# Patient Record
Sex: MALE | Race: WHITE | Employment: OTHER | URBAN - METROPOLITAN AREA
[De-identification: names, ages, dates, MRNs, and addresses within clinical notes are randomized per-mention and may not be internally consistent; named-entity substitution may affect disease eponyms.]

---

## 2017-01-26 ENCOUNTER — OFFICE VISIT (OUTPATIENT)
Dept: FAMILY MEDICINE | Facility: OTHER | Age: 62
End: 2017-01-26
Payer: COMMERCIAL

## 2017-01-26 ENCOUNTER — TELEPHONE (OUTPATIENT)
Dept: FAMILY MEDICINE | Facility: OTHER | Age: 62
End: 2017-01-26

## 2017-01-26 VITALS
DIASTOLIC BLOOD PRESSURE: 102 MMHG | HEIGHT: 66 IN | BODY MASS INDEX: 30.7 KG/M2 | RESPIRATION RATE: 14 BRPM | SYSTOLIC BLOOD PRESSURE: 180 MMHG | TEMPERATURE: 97.2 F | HEART RATE: 70 BPM | WEIGHT: 191 LBS

## 2017-01-26 DIAGNOSIS — I10 ESSENTIAL HYPERTENSION, BENIGN: Primary | ICD-10-CM

## 2017-01-26 PROCEDURE — 99213 OFFICE O/P EST LOW 20 MIN: CPT | Performed by: FAMILY MEDICINE

## 2017-01-26 RX ORDER — IRBESARTAN 300 MG/1
300 TABLET ORAL DAILY
Qty: 30 TABLET | Refills: 0 | Status: SHIPPED | OUTPATIENT
Start: 2017-01-26 | End: 2017-02-23

## 2017-01-26 RX ORDER — CHLORTHALIDONE 25 MG/1
12.5 TABLET ORAL DAILY
Qty: 15 TABLET | Refills: 0 | Status: SHIPPED | OUTPATIENT
Start: 2017-01-26 | End: 2017-03-03

## 2017-01-26 ASSESSMENT — PAIN SCALES - GENERAL: PAINLEVEL: MODERATE PAIN (5)

## 2017-01-26 NOTE — MR AVS SNAPSHOT
After Visit Summary   1/26/2017    Tomy Maldonado    MRN: [id number]           Patient Information     Date Of Birth          1955        Visit Information        Provider Department      1/26/2017 11:40 AM Magdalena Rojo MD [facility]        Today's Diagnoses     Essential hypertension, benign    -  1       Care Instructions      Low-Salt Diet (2 Grams/Day)  This diet eliminates foods that are high in salt and restricts the amount of salt that you cook with. It is most often used for patients with high blood pressure, edema (fluid retention), kidney, liver, and heart disease.  Table salt contains the mineral sodium. The body needs sodium to work normally. But too much sodium can make your health problems worse. Your health care provider is recommending a low-salt (also called low-sodium) diet for you. Your total daily allowance of salt (sodium) is 2 grams. This equals 2,000 milligrams (mg). It is less than 1 teaspoon of table salt. This means you can have only about 700 mg of sodium at each meal.  When you cook, limit the salt you use. And if you can avoid using salt, even better. Do not add salt at the table. So, throw away the saltshaker!  When shopping, read the package labels. Salt is often called  sodium  on the label. Choose foods that are salt-free, low salt, or very low salt. Note that foods with reduced salt or reduced sodium may not lower your salt intake enough.    Beverages  Ok: Tea, coffee, carbonated beverages, juices  Avoid: Flavored international coffees, electrolyte replacement drinks, sports beverages  Bread and cereals  Ok: Low sodium bread and rolls, cereals, cakes; low-salt crackers, matzo crackers  Avoid: Salted crackers, pretzels, popcorn; English toast, pancakes, muffins  Desserts  Ok: Ice cream, frozen yogurt, juice bars, gelatin, cookies and pies, sugar, honey, jelly, hard candy  Avoid: Most pies, cakes and cookies prepared or processed with salt,  instant pudding  Meats  Ok: All fresh meat, fish, poultry, low-salt tuna, eggs, egg substitute  Avoid: Smoked, pickled, brine-cured, or salted meats and fish. This includes casiano, chipped beef, corned beef, hot dogs, luncheon meats, ham, kosher meats, salt pork, sausage, canned tuna, salted codfish, smoked salmon, herring, sardines, or anchovies.  Dairy  Ok: Milk, chocolate milk, hot chocolate mix,  low-salt  cheeses, and yogurt  Avoid: Processed cheese, cheese spreads, Roquefort, Camembert, and cottage cheese, buttermilk, instant breakfast drink  Beans, potatoes, and pasta  Ok: Dry beans, split peas, lentils, potatoes, rice, macaroni, pasta, spaghetti without added salt  Avoid: Potato chips, tortilla chips, and similar products  Soups  Ok: Low-salt soups and broths made with allowed foods  Avoid: Bouillon cubes, soups with smoked or salted meats, regular soup and broth  Vegetables  Ok: Most are okay; low-salt tomato and vegetable juices  Avoid: Sauerkraut and other brine-soaked vegetables, pickles and other pickled vegetables, tomato juice, olives  Seasoning and spices  Ok: Most seasonings are okay. Good substitutes for salt include: fresh herb blends, hot sauce, lemon, garlic, rashid, vinegar, dry mustard, parsley, cilantro, horseradish, tomato paste, regular margarine, mayonnaise, butter, cream cheese, vegetable oil, cream, low-salt salad dressing and gravy  Avoid: Regular ketchup, relishes, pickles, soy sauce, teriyaki sauce, Worcestershire sauce, BBQ sauce, tartar sauce, meat tenderizer, chili sauce, regular gravy, regular salad dressing    7457-1869 [employer]. [address] [place] [address]. All rights reserved. This information is not intended as a substitute for professional medical care. Always follow your healthcare professional's instructions.              Follow-ups after your visit        Follow-up notes from your care team     Return in about 1 day (around 1/27/2017).      Who  "to contact     If you have questions or need follow up information about today's clinic visit or your schedule please contact [facility] directly at [phone].  Normal or non-critical lab and imaging results will be communicated to you by MyChart, letter or phone within 4 business days after the clinic has received the results. If you do not hear from us within 7 days, please contact the clinic through MyChart or phone. If you have a critical or abnormal lab result, we will notify you by phone as soon as possible.  Submit refill requests through [employer] or call your pharmacy and they will forward the refill request to us. Please allow 3 business days for your refill to be completed.          Additional Information About Your Visit        [employer] Information     [employer] lets you send messages to your doctor, view your test results, renew your prescriptions, schedule appointments and more. To sign up, go to [email or web address] . Click on \"Log in\" on the left side of the screen, which will take you to the Welcome page. Then click on \"Sign up Now\" on the right side of the page.     You will be asked to enter the access code listed below, as well as some personal information. Please follow the directions to create your username and password.     Your access code is: [email or web address]  Expires: 3/22/2017  1:18 PM     Your access code will  in 90 days. If you need help or a new code, please call your [place] clinic or [phone].        Care EveryWhere ID     This is your Care EveryWhere ID. This could be used by other organizations to access your [facility] medical records  [id number]        Your Vitals Were     Pulse Temperature Respirations Height BMI (Body Mass Index)       70 97.2  F (36.2  C) (Temporal) 14 5' 6.26\" (1.683 m) 30.59 kg/m2        Blood Pressure from Last 3 Encounters:   17 180/102   16 198/110   12/31/10 138/91    Weight from Last 3 Encounters:   17 191 lb " (86.637 kg)   12/22/16 190 lb (86.183 kg)   05/27/10 177 lb 6.4 oz (80.468 kg)              Today, you had the following     No orders found for display         Today's Medication Changes          These changes are accurate as of: 1/26/17 12:24 PM.  If you have any questions, ask your nurse or doctor.               Start taking these medicines.        Dose/Directions    chlorthalidone 25 MG tablet   Commonly known as:  HYGROTON   Used for:  Essential hypertension, benign   Started by:  Magdalena Rojo MD        Dose:  12.5 mg   Take 0.5 tablets (12.5 mg) by mouth daily   Quantity:  15 tablet   Refills:  0         These medicines have changed or have updated prescriptions.        Dose/Directions    irbesartan 300 MG tablet   Commonly known as:  AVAPRO   This may have changed:    - medication strength  - how much to take   Used for:  Essential hypertension, benign   Changed by:  Magdalena Rojo MD        Dose:  300 mg   Take 1 tablet (300 mg) by mouth daily   Quantity:  30 tablet   Refills:  0            Where to get your medicines      These medications were sent to [facility] [address] [place] [address] - [address]  [address] [facility] [address]     Phone:  [phone]    - chlorthalidone 25 MG tablet  - irbesartan 300 MG tablet             Primary Care Provider Office Phone # Fax #    Magdalena Rojo [phone]       [facility] [address] [facility] [address]    [facility] [address]        Thank you!     Thank you for choosing [facility]  for your care. Our goal is always to provide you with excellent care. Hearing back from our patients is one way we can continue to improve our services. Please take a few minutes to complete the written survey that you may receive in the mail after your visit with us. Thank you!             Your Updated Medication List - Protect others around you: Learn how to safely use, store and throw away your medicines at  www.disposemymeds.org.          This list is accurate as of: 1/26/17 12:24 PM.  Always use your most recent med list.                   Brand Name Dispense Instructions for use    albuterol 108 (90 BASE) MCG/ACT Inhaler   Generic drug:  albuterol     1 Inhaler    2 puffs PO q4-6 hours prn cough/wheeze/shortness of breath ( please give a spacer )       aspirin 81 MG tablet     0    ONE DAILY       chlorthalidone 25 MG tablet    HYGROTON    15 tablet    Take 0.5 tablets (12.5 mg) by mouth daily       irbesartan 300 MG tablet    AVAPRO    30 tablet    Take 1 tablet (300 mg) by mouth daily       TYLENOL PM EXTRA STRENGTH  MG tablet   Generic drug:  diphenhydrAMINE-acetaminophen     0    1 TABLET AT BEDTIME AS NEEDED

## 2017-01-26 NOTE — NURSING NOTE
"Chief Complaint   Patient presents with     Hypertension     Panel Management     flu, honoring choices, care everywhere        Initial /102 mmHg  Pulse 70  Temp(Src) 97.2  F (36.2  C) (Temporal)  Resp 14  Ht 5' 6.26\" (1.683 m)  Wt 191 lb (86.637 kg)  BMI 30.59 kg/m2 Estimated body mass index is 30.59 kg/(m^2) as calculated from the following:    Height as of this encounter: 5' 6.26\" (1.683 m).    Weight as of this encounter: 191 lb (86.637 kg).  BP completed using cuff size: regular/long  Qian Denny CMA    "

## 2017-01-26 NOTE — ASSESSMENT & PLAN NOTE
Blood pressures are still persistently elevated.  Increase dose of Avapro 300 mg daily  Add chlorthalidone  12.5 mg daily  Discussed DASH diet and regular exercise.  Recheck in one month for close follow-up. Reportable signs and symptoms discussed

## 2017-01-26 NOTE — PATIENT INSTRUCTIONS
Low-Salt Diet (2 Grams/Day)  This diet eliminates foods that are high in salt and restricts the amount of salt that you cook with. It is most often used for patients with high blood pressure, edema (fluid retention), kidney, liver, and heart disease.  Table salt contains the mineral sodium. The body needs sodium to work normally. But too much sodium can make your health problems worse. Your health care provider is recommending a low-salt (also called low-sodium) diet for you. Your total daily allowance of salt (sodium) is 2 grams. This equals 2,000 milligrams (mg). It is less than 1 teaspoon of table salt. This means you can have only about 700 mg of sodium at each meal.  When you cook, limit the salt you use. And if you can avoid using salt, even better. Do not add salt at the table. So, throw away the saltshaker!  When shopping, read the package labels. Salt is often called  sodium  on the label. Choose foods that are salt-free, low salt, or very low salt. Note that foods with reduced salt or reduced sodium may not lower your salt intake enough.    Beverages  Ok: Tea, coffee, carbonated beverages, juices  Avoid: Flavored international coffees, electrolyte replacement drinks, sports beverages  Bread and cereals  Ok: Low sodium bread and rolls, cereals, cakes; low-salt crackers, matzo crackers  Avoid: Salted crackers, pretzels, popcorn; Slovenian toast, pancakes, muffins  Desserts  Ok: Ice cream, frozen yogurt, juice bars, gelatin, cookies and pies, sugar, honey, jelly, hard candy  Avoid: Most pies, cakes and cookies prepared or processed with salt, instant pudding  Meats  Ok: All fresh meat, fish, poultry, low-salt tuna, eggs, egg substitute  Avoid: Smoked, pickled, brine-cured, or salted meats and fish. This includes casiano, chipped beef, corned beef, hot dogs, luncheon meats, ham, kosher meats, salt pork, sausage, canned tuna, salted codfish, smoked salmon, herring, sardines, or anchovies.  Dairy  Ok: Milk,  chocolate milk, hot chocolate mix,  low-salt  cheeses, and yogurt  Avoid: Processed cheese, cheese spreads, Roquefort, Camembert, and cottage cheese, buttermilk, instant breakfast drink  Beans, potatoes, and pasta  Ok: Dry beans, split peas, lentils, potatoes, rice, macaroni, pasta, spaghetti without added salt  Avoid: Potato chips, tortilla chips, and similar products  Soups  Ok: Low-salt soups and broths made with allowed foods  Avoid: Bouillon cubes, soups with smoked or salted meats, regular soup and broth  Vegetables  Ok: Most are okay; low-salt tomato and vegetable juices  Avoid: Sauerkraut and other brine-soaked vegetables, pickles and other pickled vegetables, tomato juice, olives  Seasoning and spices  Ok: Most seasonings are okay. Good substitutes for salt include: fresh herb blends, hot sauce, lemon, garlic, rashid, vinegar, dry mustard, parsley, cilantro, horseradish, tomato paste, regular margarine, mayonnaise, butter, cream cheese, vegetable oil, cream, low-salt salad dressing and gravy  Avoid: Regular ketchup, relishes, pickles, soy sauce, teriyaki sauce, Worcestershire sauce, BBQ sauce, tartar sauce, meat tenderizer, chili sauce, regular gravy, regular salad dressing    1584-0816 [employer]. [address] [place] [address], [place] [address]. All rights reserved. This information is not intended as a substitute for professional medical care. Always follow your healthcare professional's instructions.

## 2017-01-26 NOTE — TELEPHONE ENCOUNTER
Left message for patient to call back. Please see message below and help get scheduled for labs.  Parisa Lambert, [place]

## 2017-01-26 NOTE — TELEPHONE ENCOUNTER
Please call patient and informed him to have a blood draw to recheck on his sodium levels that were elevated on his last test. Future orders in place. I can see him backIn the clinic on 24 February as scheduled

## 2017-01-26 NOTE — Clinical Note
[address]  [facility] [address]  [phone]      February 1, 2017      Tomy Maldonado  [address]  [facility] [address]        Dear Tomy,    We have tried to reach you by phone but have been unsuccessful. Dr. Rojo would like your sodium levels to be rechecked because your last level was elevated. Please call the clinic at [phone] to schedule a lab only appointment to have this done. You can still see Dr. Rojo on 02/24 as scheduled.    Thank you,  [facility]

## 2017-01-26 NOTE — PROGRESS NOTES
SUBJECTIVE:                                                    Tomy Maldonado is a 61 year old male who presents to clinic today for the following health issues:      HPI    Hypertension Follow-up      Outpatient blood pressures are not being checked.    Low Salt Diet: I do not add any thing.      Problem list and histories reviewed & adjusted, as indicated.  Additional history: as documented             Patient Active Problem List   Diagnosis     Cervicalgia     Essential hypertension, benign     Allergic rhinitis     Hyperlipidemia LDL goal <130     Tendinopathy of left rotator cuff     Past Surgical History   Procedure Laterality Date     Hc knee scope,med/lat menisectomy       Left knee medial meniscectomy     C nonspecific procedure       left ankle surgery     C repair detach retina,scleral buckle  1973     Hc removal testis,simple  10/01/2007     Right inguinal orchiectomy.     Colonoscopy  12/18/2009     normal       Social History   Substance Use Topics     Smoking status: Former Smoker -- 0.50 packs/day     Start date: 12/22/1964     Quit date: 09/22/2016     Smokeless tobacco: Not on file      Comment: 4 cigs per day     Alcohol Use: Yes      Comment: some     Family History   Problem Relation Age of Onset     Hypertension Mother      Hypertension Father      DIABETES Father      adult, late onset     Lipids Father      ?     Allergies Father      Allergies Paternal Grandfather      Allergies Sister      Allergies Sister          Current Outpatient Prescriptions   Medication Sig Dispense Refill     irbesartan (AVAPRO) 300 MG tablet Take 1 tablet (300 mg) by mouth daily 30 tablet 0     chlorthalidone (HYGROTON) 25 MG tablet Take 0.5 tablets (12.5 mg) by mouth daily 15 tablet 0     TYLENOL PM EXTRA STRENGTH 500-25 MG OR TABS 1 TABLET AT BEDTIME AS NEEDED 0 0     [DISCONTINUED] irbesartan (AVAPRO) 150 MG tablet Take 1 tablet (150 mg) by mouth daily 30 tablet 1     PROAIR  (90 BASE) MCG/ACT IN AERS  "2 puffs PO q4-6 hours prn cough/wheeze/shortness of breath ( please give a spacer ) 1 Inhaler 6     ASPIRIN 81 MG OR TABS ONE DAILY 0 0     Allergies   Allergen Reactions     No Known Drug Allergies      BP Readings from Last 3 Encounters:   01/26/17 180/102   12/22/16 198/110   12/31/10 138/91    Wt Readings from Last 3 Encounters:   01/26/17 191 lb (86.637 kg)   12/22/16 190 lb (86.183 kg)   05/27/10 177 lb 6.4 oz (80.468 kg)                  Labs reviewed in EPIC  Problem list, Medication list, Allergies, and Medical/Social/Surgical histories reviewed in [facility] and updated as appropriate.    ROS:  Constitutional, HEENT, cardiovascular, pulmonary, gi and gu systems are negative, except as otherwise noted.    OBJECTIVE:                                                    /102 mmHg  Pulse 70  Temp(Src) 97.2  F (36.2  C) (Temporal)  Resp 14  Ht 5' 6.26\" (1.683 m)  Wt 191 lb (86.637 kg)  BMI 30.59 kg/m2  Body mass index is 30.59 kg/(m^2).  Physical Exam   Constitutional: He appears well-developed and well-nourished.   HENT:   Head: Normocephalic and atraumatic.   Cardiovascular: Normal rate and regular rhythm.    Pulmonary/Chest: Effort normal and breath sounds normal.   Psychiatric: He has a normal mood and affect.         Diagnostic Test Results:  none      ASSESSMENT/PLAN:                                                      Problem List Items Addressed This Visit     Essential hypertension, benign - Primary     Blood pressures are still persistently elevated.  Increase dose of Avapro 300 mg daily  Add chlorthalidone  12.5 mg daily  Discussed DASH diet and regular exercise.  Recheck in one month for close follow-up. Reportable signs and symptoms discussed         Relevant Medications    irbesartan (AVAPRO) 300 MG tablet    chlorthalidone (HYGROTON) 25 MG tablet             Magdalena Rojo MD  [facility]"

## 2017-02-21 NOTE — PROGRESS NOTES
SUBJECTIVE:                                                    Tomy Maldonado is a 61 year old male who presents to clinic today for the following health issues:      HPI    Hypertension Follow-up      Outpatient blood pressures are being checked at home.  Results are 140/90's.    Low Salt Diet: no added salt       Problem list and histories reviewed & adjusted, as indicated.  Additional history: as documented        Patient Active Problem List   Diagnosis     Cervicalgia     Essential hypertension, benign     Allergic rhinitis     Hyperlipidemia LDL goal <130     Tendinopathy of left rotator cuff     Past Surgical History   Procedure Laterality Date     Hc knee scope,med/lat menisectomy       Left knee medial meniscectomy     C nonspecific procedure       left ankle surgery     C repair detach retina,scleral buckle  1973     Hc removal testis,simple  10/01/2007     Right inguinal orchiectomy.     Colonoscopy  12/18/2009     normal       Social History   Substance Use Topics     Smoking status: Former Smoker     Packs/day: 0.50     Start date: 12/22/1964     Quit date: 9/22/2016     Smokeless tobacco: Not on file      Comment: 4 cigs per day     Alcohol use Yes      Comment: some     Family History   Problem Relation Age of Onset     Hypertension Mother      Hypertension Father      DIABETES Father      adult, late onset     Lipids Father      ?     Allergies Father      Allergies Paternal Grandfather      Allergies Sister      Allergies Sister          Current Outpatient Prescriptions   Medication Sig Dispense Refill     chlorthalidone (HYGROTON) 25 MG tablet Take 1 tablet (25 mg) by mouth daily 90 tablet 0     irbesartan (AVAPRO) 300 MG tablet Take 1 tablet (300 mg) by mouth daily 90 tablet 0     atorvastatin (LIPITOR) 40 MG tablet Take 1 tablet (40 mg) by mouth daily 90 tablet 3     PROAIR  (90 BASE) MCG/ACT IN AERS 2 puffs PO q4-6 hours prn cough/wheeze/shortness of breath ( please give a spacer ) 1  Inhaler 6     TYLENOL PM EXTRA STRENGTH 500-25 MG OR TABS 1 TABLET AT BEDTIME AS NEEDED 0 0     ASPIRIN 81 MG OR TABS ONE DAILY 0 0     [DISCONTINUED] irbesartan (AVAPRO) 300 MG tablet Take 1 tablet (300 mg) by mouth daily 30 tablet 0     [DISCONTINUED] chlorthalidone (HYGROTON) 25 MG tablet Take 0.5 tablets (12.5 mg) by mouth daily 15 tablet 0     Allergies   Allergen Reactions     No Known Drug Allergies      BP Readings from Last 3 Encounters:   03/03/17 (!) 146/100   01/26/17 (!) 180/102   12/22/16 (!) 198/110    Wt Readings from Last 3 Encounters:   03/03/17 194 lb (88 kg)   01/26/17 191 lb (86.6 kg)   12/22/16 190 lb (86.2 kg)                  Labs reviewed in EPIC    ROS:  Constitutional, HEENT, cardiovascular, pulmonary, gi and gu systems are negative, except as otherwise noted.    OBJECTIVE:                                                    BP (!) 146/100 (BP Location: Right arm, Patient Position: Chair, Cuff Size: Adult Regular)  Pulse 80  Temp 97.7  F (36.5  C) (Temporal)  Resp 14  Wt 194 lb (88 kg)  BMI 31.07 kg/m2  Body mass index is 31.07 kg/(m^2).  Physical Exam   Constitutional: He appears well-developed and well-nourished.   HENT:   Head: Normocephalic and atraumatic.   Cardiovascular: Normal rate and regular rhythm.    Pulmonary/Chest: Effort normal and breath sounds normal.   Musculoskeletal: He exhibits no edema.   Psychiatric: He has a normal mood and affect.         Diagnostic Test Results:  none      ASSESSMENT/PLAN:                                                      Problem List Items Addressed This Visit     Essential hypertension, benign     Improved but not at goal  Increase Chlorthalidone to 25mg daily . Continue irbesartan at 300mg  Had hypernatremia with last chemistries. Recheck today  Recheck in 1 month          Relevant Medications    chlorthalidone (HYGROTON) 25 MG tablet    irbesartan (AVAPRO) 300 MG tablet    Hyperlipidemia LDL goal <130     The 10-year ASCVD risk score (Shell  DONNA Earl, et al., 2013) is: 17.3%    Values used to calculate the score:      Age: 61 years      Sex: Male      Is Non- : No      Diabetic: No      Tobacco smoker: No      Systolic Blood Pressure: 146 mmHg      Is Prescribed Antihypertensives: Yes      HDL Cholesterol: 59 mg/dL      Total Cholesterol: 305 mg/dL   Start mod intensity statin            Relevant Medications    atorvastatin (LIPITOR) 40 MG tablet      Other Visit Diagnoses     Hypernatremia    -  Primary    Relevant Orders    Comprehensive metabolic panel (BMP + Alb, Alk Phos, ALT, AST, Total. Bili, TP)    Hyperlipidemia, unspecified hyperlipidemia type        Relevant Medications    atorvastatin (LIPITOR) 40 MG tablet           Magdalena Rojo MD  [facility]

## 2017-02-23 ENCOUNTER — TELEPHONE (OUTPATIENT)
Dept: FAMILY MEDICINE | Facility: OTHER | Age: 62
End: 2017-02-23

## 2017-02-23 DIAGNOSIS — I10 ESSENTIAL HYPERTENSION, BENIGN: ICD-10-CM

## 2017-02-23 RX ORDER — IRBESARTAN 300 MG/1
300 TABLET ORAL DAILY
Qty: 30 TABLET | Refills: 0 | Status: SHIPPED | OUTPATIENT
Start: 2017-02-23 | End: 2017-03-03

## 2017-02-23 NOTE — TELEPHONE ENCOUNTER
Reason for call:  Medication  Reason for Call:  Medication or medication refill:    Do you use a [facility]?  Name of the pharmacy and phone number for the current request:  [facility] - [phone]    Name of the medication requested: irbesartan (AVAPRO) 300 MG tablet    Other request: Had an appt on 2/24/17 but had to cancel because of flu but efrain appt for 3/3/17  Can we leave a detailed message on this number? YES    Phone number patient can be reached at: Home number on file [phone] (home) or Cell number on file:    No relevant phone numbers on file.       Best Time: anytime    Call taken on 2/23/2017 at 3:15 PM by Noemi Melo

## 2017-02-23 NOTE — TELEPHONE ENCOUNTER
Avapro     Last Written Prescription Date: 01/26/2017  Last Fill Quantity: 30, # refills: 0  Last Office Visit with [facility], [facility] or [facility] prescribing provider: 01/26/2017  Next 5 appointments (look out 90 days)     Mar 03, 2017 12:40 PM CST   [facility]dal[facility]    [address] [place] [id number] [phone]                   Potassium   Date Value Ref Range Status   12/22/2016 4.7 3.4 - 5.3 mmol/L Final     Creatinine   Date Value Ref Range Status   12/22/2016 1.13 0.66 - 1.25 mg/dL Final     BP Readings from Last 3 Encounters:   01/26/17 (!) 180/102   12/22/16 (!) 198/110   12/31/10 138/91

## 2017-02-23 NOTE — TELEPHONE ENCOUNTER
Medication is being filled for 1 time refill only due to:  Patient needs to be seen because due for HTN OV and labs.     Macrina Boo, RN, BSN

## 2017-03-03 ENCOUNTER — OFFICE VISIT (OUTPATIENT)
Dept: FAMILY MEDICINE | Facility: OTHER | Age: 62
End: 2017-03-03
Payer: COMMERCIAL

## 2017-03-03 VITALS
RESPIRATION RATE: 14 BRPM | HEART RATE: 80 BPM | TEMPERATURE: 97.7 F | BODY MASS INDEX: 31.07 KG/M2 | DIASTOLIC BLOOD PRESSURE: 100 MMHG | SYSTOLIC BLOOD PRESSURE: 146 MMHG | WEIGHT: 194 LBS

## 2017-03-03 DIAGNOSIS — E78.5 HYPERLIPIDEMIA LDL GOAL <130: ICD-10-CM

## 2017-03-03 DIAGNOSIS — I10 ESSENTIAL HYPERTENSION, BENIGN: ICD-10-CM

## 2017-03-03 DIAGNOSIS — E87.0 HYPERNATREMIA: Primary | ICD-10-CM

## 2017-03-03 DIAGNOSIS — E78.5 HYPERLIPIDEMIA, UNSPECIFIED HYPERLIPIDEMIA TYPE: ICD-10-CM

## 2017-03-03 LAB
ALBUMIN SERPL-MCNC: 4.1 G/DL (ref 3.4–5)
ALP SERPL-CCNC: 80 U/L (ref 40–150)
ALT SERPL W P-5'-P-CCNC: 41 U/L (ref 0–70)
ANION GAP SERPL CALCULATED.3IONS-SCNC: 10 MMOL/L (ref 3–14)
AST SERPL W P-5'-P-CCNC: 16 U/L (ref 0–45)
BILIRUB SERPL-MCNC: 0.5 MG/DL (ref 0.2–1.3)
BUN SERPL-MCNC: 19 MG/DL (ref 7–30)
CALCIUM SERPL-MCNC: 9.4 MG/DL (ref 8.5–10.1)
CHLORIDE SERPL-SCNC: 104 MMOL/L (ref 94–109)
CO2 SERPL-SCNC: 26 MMOL/L (ref 20–32)
CREAT SERPL-MCNC: 1.02 MG/DL (ref 0.66–1.25)
GFR SERPL CREATININE-BSD FRML MDRD: 74 ML/MIN/1.7M2
GLUCOSE SERPL-MCNC: 98 MG/DL (ref 70–99)
POTASSIUM SERPL-SCNC: 4.9 MMOL/L (ref 3.4–5.3)
PROT SERPL-MCNC: 7.7 G/DL (ref 6.8–8.8)
SODIUM SERPL-SCNC: 140 MMOL/L (ref 133–144)

## 2017-03-03 PROCEDURE — 80053 COMPREHEN METABOLIC PANEL: CPT | Performed by: FAMILY MEDICINE

## 2017-03-03 PROCEDURE — 36415 COLL VENOUS BLD VENIPUNCTURE: CPT | Performed by: FAMILY MEDICINE

## 2017-03-03 PROCEDURE — 99214 OFFICE O/P EST MOD 30 MIN: CPT | Performed by: FAMILY MEDICINE

## 2017-03-03 RX ORDER — ATORVASTATIN CALCIUM 40 MG/1
40 TABLET, FILM COATED ORAL DAILY
Qty: 90 TABLET | Refills: 3 | Status: SHIPPED | OUTPATIENT
Start: 2017-03-03 | End: 2019-05-16

## 2017-03-03 RX ORDER — IRBESARTAN 300 MG/1
300 TABLET ORAL DAILY
Qty: 90 TABLET | Refills: 0 | Status: SHIPPED | OUTPATIENT
Start: 2017-03-03 | End: 2017-06-27

## 2017-03-03 RX ORDER — CHLORTHALIDONE 25 MG/1
25 TABLET ORAL DAILY
Qty: 90 TABLET | Refills: 0 | Status: SHIPPED | OUTPATIENT
Start: 2017-03-03 | End: 2019-08-15

## 2017-03-03 NOTE — ASSESSMENT & PLAN NOTE
Improved but not at goal  Increase Chlorthalidone to 25mg daily . Continue irbesartan at 300mg  Had hypernatremia with last chemistries. Recheck today  Recheck in 1 month

## 2017-03-03 NOTE — ASSESSMENT & PLAN NOTE
The 10-year ASCVD risk score (Shell THOMPSON Jr., et al., 2013) is: 17.3%    Values used to calculate the score:      Age: 61 years      Sex: Male      Is Non- : No      Diabetic: No      Tobacco smoker: No      Systolic Blood Pressure: 146 mmHg      Is Prescribed Antihypertensives: Yes      HDL Cholesterol: 59 mg/dL      Total Cholesterol: 305 mg/dL   Start mod intensity statin

## 2017-03-03 NOTE — MR AVS SNAPSHOT
"              After Visit Summary   3/3/2017    Tomy Maldonado    MRN: [id number]           Patient Information     Date Of Birth          1955        Visit Information        Provider Department      3/3/2017 12:40 PM Magdalena Rojo MD [facility]        Today's Diagnoses     Hypernatremia    -  1    Essential hypertension, benign        Hyperlipidemia, unspecified hyperlipidemia type           Follow-ups after your visit        Follow-up notes from your care team     Return in about 1 month (around 4/3/2017).      Who to contact     If you have questions or need follow up information about today's clinic visit or your schedule please contact [facility] directly at [phone].  Normal or non-critical lab and imaging results will be communicated to you by MyChart, letter or phone within 4 business days after the clinic has received the results. If you do not hear from us within 7 days, please contact the clinic through MyChart or phone. If you have a critical or abnormal lab result, we will notify you by phone as soon as possible.  Submit refill requests through [employer] or call your pharmacy and they will forward the refill request to us. Please allow 3 business days for your refill to be completed.          Additional Information About Your Visit        MyChart Information     [employer] lets you send messages to your doctor, view your test results, renew your prescriptions, schedule appointments and more. To sign up, go to [email or web address] . Click on \"Log in\" on the left side of the screen, which will take you to the Welcome page. Then click on \"Sign up Now\" on the right side of the page.     You will be asked to enter the access code listed below, as well as some personal information. Please follow the directions to create your username and password.     Your access code is: [email or web address]  Expires: 3/22/2017  1:18 PM     Your access code will  in 90 days. If you " need help or a new code, please call your [place] clinic or [phone].        Care EveryWhere ID     This is your Care EveryWhere ID. This could be used by other organizations to access your [place] medical records  [id number]        Your Vitals Were     Pulse Temperature Respirations BMI (Body Mass Index)          80 97.7  F (36.5  C) (Temporal) 14 31.07 kg/m2         Blood Pressure from Last 3 Encounters:   03/03/17 (!) 146/100   01/26/17 (!) 180/102   12/22/16 (!) 198/110    Weight from Last 3 Encounters:   03/03/17 194 lb (88 kg)   01/26/17 191 lb (86.6 kg)   12/22/16 190 lb (86.2 kg)              We Performed the Following     Comprehensive metabolic panel (BMP + Alb, Alk Phos, ALT, AST, Total. Bili, TP)          Today's Medication Changes          These changes are accurate as of: 3/3/17  1:01 PM.  If you have any questions, ask your nurse or doctor.               Start taking these medicines.        Dose/Directions    atorvastatin 40 MG tablet   Commonly known as:  LIPITOR   Used for:  Hyperlipidemia, unspecified hyperlipidemia type        Dose:  40 mg   Take 1 tablet (40 mg) by mouth daily   Quantity:  90 tablet   Refills:  3         These medicines have changed or have updated prescriptions.        Dose/Directions    chlorthalidone 25 MG tablet   Commonly known as:  HYGROTON   This may have changed:  how much to take   Used for:  Essential hypertension, benign        Dose:  25 mg   Take 1 tablet (25 mg) by mouth daily   Quantity:  90 tablet   Refills:  0            Where to get your medicines      These medications were sent to [address]  [address] [facility] [address]     Phone:  [phone]     atorvastatin 40 MG tablet    chlorthalidone 25 MG tablet    irbesartan 300 MG tablet                Primary Care Provider Office Phone # Fax #    Magdalena Rojo [phone]       [facility] [address] [facility] [address]    [place]  [facility] [address]        Thank you!     Thank you for choosing [facility]  for your care. Our goal is always to provide you with excellent care. Hearing back from our patients is one way we can continue to improve our services. Please take a few minutes to complete the written survey that you may receive in the mail after your visit with us. Thank you!             Your Updated Medication List - Protect others around you: Learn how to safely use, store and throw away your medicines at www.disposemymeds.org.          This list is accurate as of: 3/3/17  1:01 PM.  Always use your most recent med list.                   Brand Name Dispense Instructions for use    albuterol 108 (90 BASE) MCG/ACT Inhaler   Generic drug:  albuterol     1 Inhaler    2 puffs PO q4-6 hours prn cough/wheeze/shortness of breath ( please give a spacer )       aspirin 81 MG tablet     0    ONE DAILY       atorvastatin 40 MG tablet    LIPITOR    90 tablet    Take 1 tablet (40 mg) by mouth daily       chlorthalidone 25 MG tablet    HYGROTON    90 tablet    Take 1 tablet (25 mg) by mouth daily       irbesartan 300 MG tablet    AVAPRO    90 tablet    Take 1 tablet (300 mg) by mouth daily       TYLENOL PM EXTRA STRENGTH  MG tablet   Generic drug:  diphenhydrAMINE-acetaminophen     0    1 TABLET AT BEDTIME AS NEEDED

## 2017-03-03 NOTE — NURSING NOTE
"Chief Complaint   Patient presents with     Hypertension     Panel Management     myclesliet, flu, honoring choices, bmp       Initial BP (!) 146/100 (BP Location: Right arm, Patient Position: Chair, Cuff Size: Adult Regular)  Pulse 80  Temp 97.7  F (36.5  C) (Temporal)  Resp 14  Wt 194 lb (88 kg)  BMI 31.07 kg/m2 Estimated body mass index is 31.07 kg/(m^2) as calculated from the following:    Height as of 1/26/17: 5' 6.26\" (1.683 m).    Weight as of this encounter: 194 lb (88 kg).  Medication Reconciliation: complete     Parisa Lambert CMA      "

## 2017-03-06 ENCOUNTER — TELEPHONE (OUTPATIENT)
Dept: FAMILY MEDICINE | Facility: OTHER | Age: 62
End: 2017-03-06

## 2017-03-06 NOTE — TELEPHONE ENCOUNTER
----- Message from Magdalena Rojo MD sent at 3/3/2017  5:40 PM CST -----  Repeat blood chemistries are completely normal.

## 2017-03-06 NOTE — LETTER
[facility]  [address] [place]   [address]  Tel. [phone]  Fax [phone]    March 7, 2017        Tomy Maldonado  [address] [place] [address]            Dear Tomy,      LAB RESULTS:     The results of your recent lab work of repeat blood chemistries done on March 3, 2017 were NORMAL.  If you have any further questions or problems, please contact our office.    Sincerely,        Shaina Rojo MD/pk

## 2017-03-06 NOTE — TELEPHONE ENCOUNTER
LM for pt to return our call, please give message from below. Rubi Gatica CMA ([facility])

## 2017-05-17 ENCOUNTER — TELEPHONE (OUTPATIENT)
Dept: FAMILY MEDICINE | Facility: OTHER | Age: 62
End: 2017-05-17

## 2017-05-17 NOTE — LETTER
[address]   [facility] [address]  Phone: [phone]  June 1, 2017      Tomy Maldonado  [address] [place] [address]      Dear Tomy,    We care about your health and have reviewed your health plan including your medical conditions, medications, and lab results.  Based on this review, it is recommended that you follow up regarding the following health topic(s):  -High Blood Pressure    We recommend you take the following action(s):  -schedule a FOLLOWUP APPOINTMENT.     Please call us at the [facility] - [phone] (or use [email or web address]) to address the above recommendations.     Thank you for trusting [facility] and we appreciate the opportunity to serve you.  We look forward to supporting your healthcare needs in the future.    Healthy Regards,    Your Health Care Team  [facility]

## 2017-05-17 NOTE — TELEPHONE ENCOUNTER
Summary:    Patient is due/failing the following:   BP CHECK and FOLLOW UP    Action needed:   Patient needs office visit for hypertension follow up.    Type of outreach:    Phone, left message for patient to call back.     Questions for provider review:    None                                                                                                                                    Sandra Moya       Chart routed to Care Team .        Panel Management Review      Patient has the following on his problem list:     Hypertension   Last three blood pressure readings:  BP Readings from Last 3 Encounters:   03/03/17 (!) 146/100   01/26/17 (!) 180/102   12/22/16 (!) 198/110     Blood pressure: FAILED    HTN Guidelines:  Age 18-59 BP range:  Less than 140/90  Age 60-85 with Diabetes:  Less than 140/90  Age 60-85 without Diabetes:  less than 150/90      Composite cancer screening  Chart review shows that this patient is due/due soon for the following None

## 2017-06-27 ENCOUNTER — TELEPHONE (OUTPATIENT)
Dept: FAMILY MEDICINE | Facility: OTHER | Age: 62
End: 2017-06-27

## 2017-06-27 DIAGNOSIS — I10 ESSENTIAL HYPERTENSION, BENIGN: ICD-10-CM

## 2017-06-28 NOTE — TELEPHONE ENCOUNTER
irbesartan (AVAPRO) 300 MG tablet      Last Written Prescription Date: 3/3/2017  Last Fill Quantity: 90, # refills: 0  Last Office Visit with [facility], [facility] or [facility] prescribing provider: 3/3/2017       Potassium   Date Value Ref Range Status   03/03/2017 4.9 3.4 - 5.3 mmol/L Final     Creatinine   Date Value Ref Range Status   03/03/2017 1.02 0.66 - 1.25 mg/dL Final     BP Readings from Last 3 Encounters:   03/03/17 (!) 146/100   01/26/17 (!) 180/102   12/22/16 (!) 198/110

## 2017-06-30 RX ORDER — IRBESARTAN 300 MG/1
TABLET ORAL
Qty: 30 TABLET | Refills: 0 | Status: SHIPPED | OUTPATIENT
Start: 2017-06-30 | End: 2019-05-13

## 2017-06-30 NOTE — TELEPHONE ENCOUNTER
Routing refill request to provider for review/approval because:  Patient needs to be seen because:  Over due for 1 month follow up from [facility]  Due for a BP check     Carlene Monsivais RN, BSN

## 2017-06-30 NOTE — TELEPHONE ENCOUNTER
Short prescription sent. Patient needs appointment before further refills. Please help schedule appointment for physical.

## 2017-07-03 NOTE — TELEPHONE ENCOUNTER
LM for pt to return call to the clinic.   Please inform pt of message below and assist with scheduling.   Jolynn Olea MA  July 3, 2017

## 2017-07-06 NOTE — TELEPHONE ENCOUNTER
LM for patient to return phone call to clinic about message below.    Susan Almodovar CMA ([facility])

## 2017-08-16 ENCOUNTER — TELEPHONE (OUTPATIENT)
Dept: FAMILY MEDICINE | Facility: OTHER | Age: 62
End: 2017-08-16

## 2017-08-16 NOTE — LETTER
[address]   [facility] [address]  Phone: [phone]  August 25, 2017      Tomy Maldonado  [address] [place] [address]      Dear Tomy,    We care about your health and have reviewed your health plan including your medical conditions, medications, and lab results.  Based on this review, it is recommended that you follow up regarding the following health topic(s):  -High Blood Pressure    We recommend you take the following action(s):  -schedule a FOLLOWUP APPOINTMENT.     Please call us at the [facility] - [phone] (or use [email or web address]) to address the above recommendations.     Thank you for trusting [facility] and we appreciate the opportunity to serve you.  We look forward to supporting your healthcare needs in the future.    Healthy Regards,    Your Health Care Team  [facility]

## 2017-08-16 NOTE — TELEPHONE ENCOUNTER
Summary:    Patient is due/failing the following:   BP CHECK and FOLLOW UP    Action needed:   Patient needs office visit for follow up.    Type of outreach:    Phone, left message for patient to call back.     Questions for provider review:    None                                                                                                                                    Sandra Moya       Chart routed to Care Team .        Panel Management Review      Patient has the following on his problem list:     Hypertension   Last three blood pressure readings:  BP Readings from Last 3 Encounters:   03/03/17 (!) 146/100   01/26/17 (!) 180/102   12/22/16 (!) 198/110     Blood pressure: FAILED    HTN Guidelines:  Age 18-59 BP range:  Less than 140/90  Age 60-85 with Diabetes:  Less than 140/90  Age 60-85 without Diabetes:  less than 150/90        Composite cancer screening  Chart review shows that this patient is due/due soon for the following None

## 2018-01-25 ENCOUNTER — TELEPHONE (OUTPATIENT)
Dept: FAMILY MEDICINE | Facility: OTHER | Age: 63
End: 2018-01-25

## 2018-01-25 NOTE — LETTER
[address]   [facility] [address]  Phone: [phone]  January 25, 2018      Tomy Maldonado  [address] [place] [address]      Dear Tomy,    We care about your health and have reviewed your health plan including your medical conditions, medications, and lab results.  Based on this review, it is recommended that you follow up regarding the following health topic(s):  -High Blood Pressure    We recommend you take the following action(s):  -schedule a FOLLOWUP APPOINTMENT.     Please call us at the [facility] - [phone] (or use [email or web address]) to address the above recommendations.     Thank you for trusting [facility] and we appreciate the opportunity to serve you.  We look forward to supporting your healthcare needs in the future.    Healthy Regards,    Your Health Care Team  [facility]

## 2018-01-25 NOTE — TELEPHONE ENCOUNTER
Panel Management Review      Patient has the following on his problem list:     Hypertension   Last three blood pressure readings:  BP Readings from Last 3 Encounters:   03/03/17 (!) 146/100   01/26/17 (!) 180/102   12/22/16 (!) 198/110     Blood pressure: FAILED    HTN Guidelines:  Age 18-59 BP range:  Less than 140/90  Age 60-85 with Diabetes:  Less than 140/90  Age 60-85 without Diabetes:  less than 150/90      Composite cancer screening  Chart review shows that this patient is due/due soon for the following None  Summary:    Patient is due/failing the following:   BP CHECK and FOLLOW UP    Action needed:   Patient needs office visit for hypertension follow up.    Type of outreach:    phone number listed no longer in service. reminder letter sent    Questions for provider review:    None                                                                                                                                    Sandra Moya       Chart routed to Care Team .

## 2019-05-10 NOTE — PROGRESS NOTES
SUBJECTIVE:   CC: Tomy Maldonado is an 64 year old male who presents for preventative health visit.     Healthy Habits:     Getting at least 3 servings of Calcium per day:  Yes    Bi-annual eye exam:  NO    Dental care twice a year:  NO    Sleep apnea or symptoms of sleep apnea:  None    Diet:  Regular (no restrictions)    Frequency of exercise:  1 day/week    Duration of exercise:  15-30 minutes    Medication side effects:  Lightheadedness    PHQ-2 Total Score: 0    Additional concerns today:  No      Today's PHQ-2 Score:   PHQ-2 (  [employer]) 2019   Q1: Little interest or pleasure in doing things 0   Q2: Feeling down, depressed or hopeless 0   PHQ-2 Score 0   Q1: Little interest or pleasure in doing things Not at all   Q2: Feeling down, depressed or hopeless Not at all   PHQ-2 Score 0       Abuse: Current or Past(Physical, Sexual or Emotional)- No  Do you feel safe in your environment? Yes    Social History     Tobacco Use     Smoking status: Former Smoker     Packs/day: 0.50     Start date: 1964     Last attempt to quit: 2016     Years since quittin.6     Smokeless tobacco: Current User     Tobacco comment: 4 cigs per day   Substance Use Topics     Alcohol use: Yes     Comment: some         Alcohol Use 2019   Prescreen: >3 drinks/day or >7 drinks/week? Yes   AUDIT SCORE  12     AUDIT - Alcohol Use Disorders Identification Test - Reproduced from the World Health Organization Audit 2001 (Second Edition) 2019   1.  How often do you have a drink containing alcohol? 2 to 3 times a week   2.  How many drinks containing alcohol do you have on a typical day when you are drinking? 5 or 6   3.  How often do you have five or more drinks on one occasion? Weekly   4.  How often during the last year have you found that you were not able to stop drinking once you had started? Never   5.  How often during the last year have you failed to do what was normally expected of you because of drinking?  "Monthly   6.  How often during the last year have you needed a first drink in the morning to get yourself going after a heavy drinking session? Never   7.  How often during the last year have you had a feeling of guilt or remorse after drinking? Never   8.  How often during the last year have you been unable to remember what happened the night before because of your drinking? Never   9.  Have you or someone else been injured because of your drinking? No   10. Has a relative, friend, doctor or other health care worker been concerned about your drinking or suggested you cut down? Yes, but not in the last year   TOTAL SCORE 12       Last PSA:   PSA   Date Value Ref Range Status   12/09/2009 3.24 0 - 4 ug/L Final       Reviewed orders with patient. Reviewed health maintenance and updated orders accordingly - Yes  Lab work is in process    Reviewed and updated as needed this visit by clinical staff  Tobacco  Allergies  Meds  Med Hx  Surg Hx  Fam Hx  Soc Hx        Reviewed and updated as needed this visit by Provider        Past Medical History:   Diagnosis Date     Cervicalgia 1/14/2007      Past Surgical History:   Procedure Laterality Date     C NONSPECIFIC PROCEDURE      left ankle surgery     C REPAIR DETACH RETINA,SCLERAL BUCKLE  1973     COLONOSCOPY  12/18/2009    normal     HC KNEE SCOPE,MED/LAT MENISECTOMY      Left knee medial meniscectomy     HC REMOVAL TESTIS,SIMPLE  10/01/2007    Right inguinal orchiectomy.       Review of Systems   Constitutional: Negative for chills.   HENT: Positive for congestion.    Respiratory: Negative for cough.    Cardiovascular: Positive for chest pain.   Gastrointestinal: Negative for abdominal pain, constipation and hematochezia.   Genitourinary: Negative for hematuria.       OBJECTIVE:   BP (!) 152/104 (Cuff Size: Adult Regular)   Pulse 92   Temp 97.6  F (36.4  C) (Temporal)   Resp 18   Ht 1.676 m (5' 6\")   Wt 85.2 kg (187 lb 14.4 oz)   SpO2 98%   BMI 30.33 kg/m  "     Physical Exam  GENERAL: healthy, alert and no distress  EYES: Eyes grossly normal to inspection, PERRL and conjunctivae and sclerae normal  HENT: ear canals and TM's normal, nose and mouth without ulcers or lesions  NECK: no adenopathy, no asymmetry, masses, or scars and thyroid normal to palpation  RESP: lungs clear to auscultation - no rales, rhonchi or wheezes  CV: regular rate and rhythm, normal S1 S2, no S3 or S4, no murmur, click or rub, no peripheral edema and peripheral pulses strong  ABDOMEN: soft, nontender, no hepatosplenomegaly, no masses and bowel sounds normal  MS: no gross musculoskeletal defects noted, no edema  SKIN: no suspicious lesions or rashes  NEURO: Normal strength and tone, mentation intact and speech normal  PSYCH: mentation appears normal, affect normal/bright    Diagnostic Test Results:  No results found for this or any previous visit (from the past 24 hour(s)).    ASSESSMENT/PLAN:   1. Routine general medical examination at a health care facility  - Comprehensive metabolic panel  - Lipid panel reflex to direct LDL Fasting  - PSA, screen  - Albumin Random Urine Quantitative with Creat Ratio    2. Screening for HIV (human immunodeficiency virus)  3. Need for prophylactic vaccination with tetanus-diphtheria (Td)  Declines HM intervention    4. Hypertension goal BP (blood pressure) < 140/90  - Comprehensive metabolic panel  - Lipid panel reflex to direct LDL Fasting  - Albumin Random Urine Quantitative with Creat Ratio    5. Hyperlipidemia LDL goal <130  - Comprehensive metabolic panel  - Albumin Random Urine Quantitative with Creat Ratio    6. Screening PSA (prostate specific antigen)  - PSA, screen    7. Hyperlipidemia, unspecified hyperlipidemia type  - atorvastatin (LIPITOR) 40 MG tablet; Take 1 tablet (40 mg) by mouth daily  Dispense: 90 tablet; Refill: 1    COUNSELING:   Reviewed preventive health counseling, as reflected in patient instructions       Consider AAA screening for  "ages 65-75 and smoking history       Regular exercise       Healthy diet/nutrition       Vision screening       Hearing screening    Estimated body mass index is 30.33 kg/m  as calculated from the following:    Height as of this encounter: 1.676 m (5' 6\").    Weight as of this encounter: 85.2 kg (187 lb 14.4 oz).     Weight management plan: Discussed healthy diet and exercise guidelines     reports that he quit smoking about 2 years ago. He started smoking about 54 years ago. He smoked 0.50 packs per day. He uses smokeless tobacco.     Recheck in clinic in 2-3 weeks for BP      Counseling Resources:  ATP IV Guidelines  Pooled Cohorts Equation Calculator  FRAX Risk Assessment  ICSI Preventive Guidelines  Dietary Guidelines for Americans, 2010  USDA's MyPlate  ASA Prophylaxis  Lung CA Screening    Demetris Duke PA-C  [facility]  "

## 2019-05-13 ENCOUNTER — HOSPITAL ENCOUNTER (EMERGENCY)
Facility: CLINIC | Age: 64
Discharge: HOME | End: 2019-05-13
Attending: EMERGENCY MEDICINE | Admitting: EMERGENCY MEDICINE
Payer: COMMERCIAL

## 2019-05-13 VITALS
HEART RATE: 70 BPM | SYSTOLIC BLOOD PRESSURE: 146 MMHG | TEMPERATURE: 97 F | DIASTOLIC BLOOD PRESSURE: 104 MMHG | RESPIRATION RATE: 14 BRPM | OXYGEN SATURATION: 98 %

## 2019-05-13 DIAGNOSIS — I10 ESSENTIAL HYPERTENSION, BENIGN: ICD-10-CM

## 2019-05-13 DIAGNOSIS — I10 HYPERTENSION, UNSPECIFIED TYPE: ICD-10-CM

## 2019-05-13 LAB
ALBUMIN SERPL-MCNC: 4.2 G/DL (ref 3.4–5)
ALP SERPL-CCNC: 76 U/L (ref 40–150)
ALT SERPL W P-5'-P-CCNC: 51 U/L (ref 0–70)
ANION GAP SERPL CALCULATED.3IONS-SCNC: 11 MMOL/L (ref 3–14)
AST SERPL W P-5'-P-CCNC: 22 U/L (ref 0–45)
BASOPHILS # BLD AUTO: 0 10E9/L (ref 0–0.2)
BASOPHILS NFR BLD AUTO: 0.5 %
BILIRUB SERPL-MCNC: 0.5 MG/DL (ref 0.2–1.3)
BUN SERPL-MCNC: 19 MG/DL (ref 7–30)
CALCIUM SERPL-MCNC: 9.4 MG/DL (ref 8.5–10.1)
CHLORIDE SERPL-SCNC: 108 MMOL/L (ref 94–109)
CO2 SERPL-SCNC: 26 MMOL/L (ref 20–32)
CREAT SERPL-MCNC: 0.94 MG/DL (ref 0.66–1.25)
DIFFERENTIAL METHOD BLD: NORMAL
EOSINOPHIL NFR BLD AUTO: 0.8 %
ERYTHROCYTE [DISTWIDTH] IN BLOOD BY AUTOMATED COUNT: 12.4 % (ref 10–15)
GFR SERPL CREATININE-BSD FRML MDRD: 85 ML/MIN/{1.73_M2}
GLUCOSE SERPL-MCNC: 89 MG/DL (ref 70–99)
HCT VFR BLD AUTO: 47.4 % (ref 40–53)
HGB BLD-MCNC: 15.7 G/DL (ref 13.3–17.7)
IMM GRANULOCYTES # BLD: 0 10E9/L (ref 0–0.4)
IMM GRANULOCYTES NFR BLD: 0.4 %
LYMPHOCYTES # BLD AUTO: 1.8 10E9/L (ref 0.8–5.3)
LYMPHOCYTES NFR BLD AUTO: 22.8 %
MCH RBC QN AUTO: 31.7 PG (ref 26.5–33)
MCHC RBC AUTO-ENTMCNC: 33.1 G/DL (ref 31.5–36.5)
MCV RBC AUTO: 96 FL (ref 78–100)
MONOCYTES # BLD AUTO: 0.8 10E9/L (ref 0–1.3)
MONOCYTES NFR BLD AUTO: 9.9 %
NEUTROPHILS # BLD AUTO: 5.1 10E9/L (ref 1.6–8.3)
NEUTROPHILS NFR BLD AUTO: 65.6 %
NRBC # BLD AUTO: 0 10*3/UL
NRBC BLD AUTO-RTO: 0 /100
PLATELET # BLD AUTO: 211 10E9/L (ref 150–450)
POTASSIUM SERPL-SCNC: 4 MMOL/L (ref 3.4–5.3)
PROT SERPL-MCNC: 7.6 G/DL (ref 6.8–8.8)
RBC # BLD AUTO: 4.95 10E12/L (ref 4.4–5.9)
SODIUM SERPL-SCNC: 145 MMOL/L (ref 133–144)
TROPONIN I SERPL-MCNC: <0.015 UG/L (ref 0–0.04)
WBC # BLD AUTO: 7.7 10E9/L (ref 4–11)

## 2019-05-13 PROCEDURE — 93005 ELECTROCARDIOGRAM TRACING: CPT

## 2019-05-13 PROCEDURE — 85025 COMPLETE CBC W/AUTO DIFF WBC: CPT | Performed by: EMERGENCY MEDICINE

## 2019-05-13 PROCEDURE — 25000132 ZZH RX MED GY IP 250 OP 250 PS 637: Performed by: EMERGENCY MEDICINE

## 2019-05-13 PROCEDURE — 84484 ASSAY OF TROPONIN QUANT: CPT | Performed by: EMERGENCY MEDICINE

## 2019-05-13 PROCEDURE — 99284 EMERGENCY DEPT VISIT MOD MDM: CPT | Mod: 25 | Performed by: EMERGENCY MEDICINE

## 2019-05-13 PROCEDURE — 99284 EMERGENCY DEPT VISIT MOD MDM: CPT

## 2019-05-13 PROCEDURE — 93010 ELECTROCARDIOGRAM REPORT: CPT | Mod: Z6 | Performed by: EMERGENCY MEDICINE

## 2019-05-13 PROCEDURE — 80053 COMPREHEN METABOLIC PANEL: CPT | Performed by: EMERGENCY MEDICINE

## 2019-05-13 RX ORDER — CLONIDINE HYDROCHLORIDE 0.1 MG/1
0.2 TABLET ORAL ONCE
Status: COMPLETED | OUTPATIENT
Start: 2019-05-13 | End: 2019-05-13

## 2019-05-13 RX ORDER — IRBESARTAN 300 MG/1
300 TABLET ORAL DAILY
Qty: 30 TABLET | Refills: 0 | Status: SHIPPED | OUTPATIENT
Start: 2019-05-13 | End: 2019-05-30

## 2019-05-13 RX ADMIN — CLONIDINE HYDROCHLORIDE 0.2 MG: 0.1 TABLET ORAL at 19:00

## 2019-05-13 NOTE — ED TRIAGE NOTES
"Presents with high blood pressure. States he took himself off his medication years ago. \"I guess I shouldn't have done that\". States he was found to be high when he had his root canal.  "

## 2019-05-13 NOTE — ED PROVIDER NOTES
"  History     Chief Complaint   Patient presents with     Hypertension     The history is provided by the patient.     Tomy Maldonado is a 64 year old male who presents to the emergency department for hypertension. Patient reports having noticed his hypertension is back since 5/9/19 during a root canal. Patient reports feeling lightheaded this morning along with seeing black dots today at work and dizziness. He states he went home and checked his blood pressure which was around 190/120. Patient states he took himself off of his blood pressure medication years ago. \"I guess I shouldn't have done that\". He reports having left sided chest pain on and off for a few weeks. He states he does have a slight headache (1/10) right now. His last episode of chest pain was last night that lasted for a couple of hours. Patient reports being on Lisinopril and getting a cough, he also tried Avapro and \"didn't like how it made me feel\". He has quit smoking.    Allergies:  Allergies   Allergen Reactions     No Known Drug Allergies        Problem List:    Patient Active Problem List    Diagnosis Date Noted     Tendinopathy of left rotator cuff 12/22/2016     Priority: Medium     Hyperlipidemia LDL goal <130 05/20/2011     Priority: Medium     Allergic rhinitis 09/11/2007     Priority: Medium     Controlled with OTC, ragweed  Problem list name updated by automated process. Provider to review       Cervicalgia 01/14/2007     Priority: Medium     Essential hypertension, benign 01/14/2007     Priority: Medium        Past Medical History:    Past Medical History:   Diagnosis Date     BENIGN HYPERTENSION 1/14/2007     CERVICALGIA 1/14/2007       Past Surgical History:    Past Surgical History:   Procedure Laterality Date     C NONSPECIFIC PROCEDURE      left ankle surgery     C REPAIR DETACH RETINA,SCLERAL BUCKLE  1973     COLONOSCOPY  12/18/2009    normal     HC KNEE SCOPE,MED/LAT MENISECTOMY      Left knee medial meniscectomy     HC " REMOVAL TESTIS,SIMPLE  10/01/2007    Right inguinal orchiectomy.       Family History:    Family History   Problem Relation Age of Onset     Hypertension Mother      Hypertension Father      Diabetes Father         adult, late onset     Lipids Father         ?     Allergies Father      Allergies Paternal Grandfather      Allergies Sister      Allergies Sister        Social History:  Marital Status:   [2]  Social History     Tobacco Use     Smoking status: Former Smoker     Packs/day: 0.50     Start date: 1964     Last attempt to quit: 2016     Years since quittin.6     Smokeless tobacco: Current User     Tobacco comment: 4 cigs per day   Substance Use Topics     Alcohol use: Yes     Comment: some     Drug use: No        Medications:      irbesartan (AVAPRO) 300 MG tablet   ASPIRIN 81 MG OR TABS   atorvastatin (LIPITOR) 40 MG tablet   chlorthalidone (HYGROTON) 25 MG tablet   PROAIR  (90 BASE) MCG/ACT IN AERS   TYLENOL PM EXTRA STRENGTH 500-25 MG OR TABS         Review of Systems   All other systems reviewed and are negative.      Physical Exam   BP: (!) 209/129  Pulse: 89  Heart Rate: 93  Temp: 97  F (36.1  C)  Resp: 16  SpO2: 97 %      Physical Exam   Constitutional: He appears well-developed and well-nourished. No distress.   HENT:   Head: Normocephalic and atraumatic.   Mouth/Throat: Oropharynx is clear and moist.   Eyes: Pupils are equal, round, and reactive to light. No scleral icterus.   Neck: Normal range of motion.   Cardiovascular: Normal rate, regular rhythm, normal heart sounds and intact distal pulses.   Pulmonary/Chest: Effort normal and breath sounds normal. No respiratory distress.   Abdominal: Soft. Bowel sounds are normal. There is no tenderness.   Musculoskeletal: Normal range of motion. He exhibits no edema or tenderness.   Skin: Skin is warm. No rash noted. He is not diaphoretic.   Psychiatric: He has a normal mood and affect. His behavior is normal. Thought content  normal.   Nursing note and vitals reviewed.      ED Course        Procedures             EKG reveals normal sinus rhythm at 90 bpm.  No acute ST segment or T wave changes noted.  Interpreted by myself.    Critical Care time:  none               Results for orders placed or performed during the hospital encounter of 05/13/19 (from the past 24 hour(s))   CBC with platelets differential   Result Value Ref Range    WBC 7.7 4.0 - 11.0 10e9/L    RBC Count 4.95 4.4 - 5.9 10e12/L    Hemoglobin 15.7 13.3 - 17.7 g/dL    Hematocrit 47.4 40.0 - 53.0 %    MCV 96 78 - 100 fl    MCH 31.7 26.5 - 33.0 pg    MCHC 33.1 31.5 - 36.5 g/dL    RDW 12.4 10.0 - 15.0 %    Platelet Count 211 150 - 450 10e9/L    Diff Method Automated Method     % Neutrophils 65.6 %    % Lymphocytes 22.8 %    % Monocytes 9.9 %    % Eosinophils 0.8 %    % Basophils 0.5 %    % Immature Granulocytes 0.4 %    Nucleated RBCs 0 0 /100    Absolute Neutrophil 5.1 1.6 - 8.3 10e9/L    Absolute Lymphocytes 1.8 0.8 - 5.3 10e9/L    Absolute Monocytes 0.8 0.0 - 1.3 10e9/L    Absolute Basophils 0.0 0.0 - 0.2 10e9/L    Abs Immature Granulocytes 0.0 0 - 0.4 10e9/L    Absolute Nucleated RBC 0.0    Comprehensive metabolic panel   Result Value Ref Range    Sodium 145 (H) 133 - 144 mmol/L    Potassium 4.0 3.4 - 5.3 mmol/L    Chloride 108 94 - 109 mmol/L    Carbon Dioxide 26 20 - 32 mmol/L    Anion Gap 11 3 - 14 mmol/L    Glucose 89 70 - 99 mg/dL    Urea Nitrogen 19 7 - 30 mg/dL    Creatinine 0.94 0.66 - 1.25 mg/dL    GFR Estimate 85 >60 mL/min/[1.73_m2]    GFR Estimate If Black >90 >60 mL/min/[1.73_m2]    Calcium 9.4 8.5 - 10.1 mg/dL    Bilirubin Total 0.5 0.2 - 1.3 mg/dL    Albumin 4.2 3.4 - 5.0 g/dL    Protein Total 7.6 6.8 - 8.8 g/dL    Alkaline Phosphatase 76 40 - 150 U/L    ALT 51 0 - 70 U/L    AST 22 0 - 45 U/L   Troponin I   Result Value Ref Range    Troponin I ES <0.015 0.000 - 0.045 ug/L       Medications   cloNIDine (CATAPRES) tablet 0.2 mg (0.2 mg Oral Given 5/13/19 1900)        Assessments & Plan (with Medical Decision Making)  64-year-old male with known hypertension off his medications.  Presented here with some abnormal chest pressure.  EKG and troponin without abnormal finding.  Was given clonidine.  Have restarted his Avapro.  He has a follow-up appointment scheduled with primary care in 3 days.  Have encouraged him to keep that appointment.  Return anytime sooner if condition worsens.     I have reviewed the nursing notes.    I have reviewed the findings, diagnosis, plan and need for follow up with the patient.          Medication List      There are no discharge medications for this visit.         Final diagnoses:   Hypertension, unspecified type     This document serves as a record of services personally performed by Bryan Garza MD. It was created on their behalf by Linda Gibson, a trained medical scribe. The creation of this record is based on the provider's personal observations and the statements of the patient. This document has been checked and approved by the attending provider.    Note: Chart documentation done in part with Dragon Voice Recognition software. Although reviewed after completion, some word and grammatical errors may remain.    5/13/2019   [facility] EMERGENCY DEPARTMENT     Bryan Garza MD  05/13/19 7905

## 2019-05-13 NOTE — ED AVS SNAPSHOT
[facility] Emergency Department  [address] [place] [address]  Phone:  [phone]  Fax:  [phone]                                    Tomy Maldonado   MRN: [id number]    Department:  [facility] Emergency Department   Date of Visit:  5/13/2019           After Visit Summary Signature Page    I have received my discharge instructions, and my questions have been answered. I have discussed any challenges I see with this plan with the nurse or doctor.    ..........................................................................................................................................  Patient/Patient Representative Signature      ..........................................................................................................................................  Patient Representative Print Name and Relationship to Patient    ..................................................               ................................................  Date                                   Time    ..........................................................................................................................................  Reviewed by Signature/Title    ...................................................              ..............................................  Date                                               Time          22EPIC Rev 08/18

## 2019-05-16 ENCOUNTER — OFFICE VISIT (OUTPATIENT)
Dept: FAMILY MEDICINE | Facility: OTHER | Age: 64
End: 2019-05-16
Payer: COMMERCIAL

## 2019-05-16 VITALS
HEIGHT: 66 IN | OXYGEN SATURATION: 98 % | WEIGHT: 187.9 LBS | TEMPERATURE: 97.6 F | HEART RATE: 92 BPM | SYSTOLIC BLOOD PRESSURE: 150 MMHG | RESPIRATION RATE: 18 BRPM | BODY MASS INDEX: 30.2 KG/M2 | DIASTOLIC BLOOD PRESSURE: 106 MMHG

## 2019-05-16 DIAGNOSIS — E78.5 HYPERLIPIDEMIA, UNSPECIFIED HYPERLIPIDEMIA TYPE: ICD-10-CM

## 2019-05-16 DIAGNOSIS — I10 HYPERTENSION GOAL BP (BLOOD PRESSURE) < 140/90: ICD-10-CM

## 2019-05-16 DIAGNOSIS — Z23 NEED FOR PROPHYLACTIC VACCINATION WITH TETANUS-DIPHTHERIA (TD): ICD-10-CM

## 2019-05-16 DIAGNOSIS — Z00.00 ROUTINE GENERAL MEDICAL EXAMINATION AT A HEALTH CARE FACILITY: Primary | ICD-10-CM

## 2019-05-16 DIAGNOSIS — E78.5 HYPERLIPIDEMIA LDL GOAL <130: ICD-10-CM

## 2019-05-16 DIAGNOSIS — Z12.5 SCREENING PSA (PROSTATE SPECIFIC ANTIGEN): ICD-10-CM

## 2019-05-16 DIAGNOSIS — Z11.4 SCREENING FOR HIV (HUMAN IMMUNODEFICIENCY VIRUS): ICD-10-CM

## 2019-05-16 LAB
ALBUMIN SERPL-MCNC: 4.5 G/DL (ref 3.4–5)
ALP SERPL-CCNC: 79 U/L (ref 40–150)
ALT SERPL W P-5'-P-CCNC: 57 U/L (ref 0–70)
ANION GAP SERPL CALCULATED.3IONS-SCNC: 10 MMOL/L (ref 3–14)
AST SERPL W P-5'-P-CCNC: 27 U/L (ref 0–45)
BILIRUB SERPL-MCNC: 0.8 MG/DL (ref 0.2–1.3)
BUN SERPL-MCNC: 18 MG/DL (ref 7–30)
CALCIUM SERPL-MCNC: 10 MG/DL (ref 8.5–10.1)
CHLORIDE SERPL-SCNC: 106 MMOL/L (ref 94–109)
CHOLEST SERPL-MCNC: 362 MG/DL
CO2 SERPL-SCNC: 26 MMOL/L (ref 20–32)
CREAT SERPL-MCNC: 1.08 MG/DL (ref 0.66–1.25)
CREAT UR-MCNC: 305 MG/DL
GFR SERPL CREATININE-BSD FRML MDRD: 72 ML/MIN/{1.73_M2}
GLUCOSE SERPL-MCNC: 102 MG/DL (ref 70–99)
HDLC SERPL-MCNC: 76 MG/DL
LDLC SERPL CALC-MCNC: 258 MG/DL
MICROALBUMIN UR-MCNC: 36 MG/L
MICROALBUMIN/CREAT UR: 11.74 MG/G CR (ref 0–17)
NONHDLC SERPL-MCNC: 286 MG/DL
POTASSIUM SERPL-SCNC: 5 MMOL/L (ref 3.4–5.3)
PROT SERPL-MCNC: 8.2 G/DL (ref 6.8–8.8)
PSA SERPL-ACNC: 1.06 UG/L (ref 0–4)
SODIUM SERPL-SCNC: 142 MMOL/L (ref 133–144)
TRIGL SERPL-MCNC: 140 MG/DL

## 2019-05-16 PROCEDURE — 99396 PREV VISIT EST AGE 40-64: CPT | Performed by: PHYSICIAN ASSISTANT

## 2019-05-16 PROCEDURE — 36415 COLL VENOUS BLD VENIPUNCTURE: CPT | Performed by: PHYSICIAN ASSISTANT

## 2019-05-16 PROCEDURE — 80061 LIPID PANEL: CPT | Performed by: PHYSICIAN ASSISTANT

## 2019-05-16 PROCEDURE — 80053 COMPREHEN METABOLIC PANEL: CPT | Performed by: PHYSICIAN ASSISTANT

## 2019-05-16 PROCEDURE — 99213 OFFICE O/P EST LOW 20 MIN: CPT | Mod: 25 | Performed by: PHYSICIAN ASSISTANT

## 2019-05-16 PROCEDURE — 82043 UR ALBUMIN QUANTITATIVE: CPT | Performed by: PHYSICIAN ASSISTANT

## 2019-05-16 PROCEDURE — G0103 PSA SCREENING: HCPCS | Performed by: PHYSICIAN ASSISTANT

## 2019-05-16 RX ORDER — ATORVASTATIN CALCIUM 40 MG/1
40 TABLET, FILM COATED ORAL DAILY
Qty: 90 TABLET | Refills: 1 | Status: SHIPPED | OUTPATIENT
Start: 2019-05-16 | End: 2019-11-12

## 2019-05-16 ASSESSMENT — MIFFLIN-ST. JEOR: SCORE: 1585.06

## 2019-05-16 ASSESSMENT — ENCOUNTER SYMPTOMS
CHILLS: 0
ABDOMINAL PAIN: 0
COUGH: 0
HEMATURIA: 0
CONSTIPATION: 0
HEMATOCHEZIA: 0

## 2019-05-16 ASSESSMENT — PAIN SCALES - GENERAL: PAINLEVEL: NO PAIN (0)

## 2019-05-17 DIAGNOSIS — E78.5 HYPERLIPIDEMIA LDL GOAL <130: Primary | ICD-10-CM

## 2019-05-24 NOTE — PROGRESS NOTES
Subjective     Tomy Maldonado is a 64 year old male who presents to clinic today for the following health issues:    History of Present Illness     Hyperlipidemia:  He presents for follow up of hyperlipidemia.  He is taking medication to lower cholesterol. He is not having myalgia or other side effects to statin medications.He is not reporting shortness of breath, increased sweating or nausea with activity, left-sided neck or arm pain, chest pain or pressure, or pain in calves when walking 1-2 blocks.    Hypertension: He presents for follow up of hypertension.  He does not check blood pressure  regularly outside of the clinic. Outside blood pressures have been over 140/90. He follows a low salt diet.     He eats 4 or more servings of fruits and vegetables daily.He consumes 0 sweetened beverage(s) daily.  He is taking medications regularly.     Patient Active Problem List   Diagnosis     Cervicalgia     Allergic rhinitis     Hyperlipidemia LDL goal <130     Tendinopathy of left rotator cuff     Hypertension goal BP (blood pressure) < 140/90     Past Surgical History:   Procedure Laterality Date     C NONSPECIFIC PROCEDURE      left ankle surgery     C REPAIR DETACH RETINA,SCLERAL BUCKLE  1973     COLONOSCOPY  2009    normal     HC KNEE SCOPE,MED/LAT MENISECTOMY      Left knee medial meniscectomy     HC REMOVAL TESTIS,SIMPLE  10/01/2007    Right inguinal orchiectomy.       Social History     Tobacco Use     Smoking status: Former Smoker     Packs/day: 0.50     Start date: 1964     Last attempt to quit: 2016     Years since quittin.6     Smokeless tobacco: Current User     Tobacco comment: 4 cigs per day   Substance Use Topics     Alcohol use: Yes     Comment: some     Family History   Problem Relation Age of Onset     Hypertension Mother      Hypertension Father      Diabetes Father         adult, late onset     Lipids Father         ?     Allergies Father      Allergies Paternal Grandfather       Allergies Sister      Allergies Sister          Current Outpatient Medications   Medication Sig Dispense Refill     ASPIRIN 81 MG OR TABS ONE DAILY 0 0     atorvastatin (LIPITOR) 40 MG tablet Take 1 tablet (40 mg) by mouth daily 90 tablet 1     irbesartan (AVAPRO) 300 MG tablet Take 1 tablet (300 mg) by mouth daily 90 tablet 1     TYLENOL PM EXTRA STRENGTH 500-25 MG OR TABS 1 TABLET AT BEDTIME AS NEEDED 0 0     chlorthalidone (HYGROTON) 25 MG tablet Take 1 tablet (25 mg) by mouth daily (Patient not taking: Reported on 5/16/2019) 90 tablet 0     Allergies   Allergen Reactions     No Known Drug Allergies      BP Readings from Last 3 Encounters:   05/30/19 132/88   05/16/19 (!) 150/106   05/13/19 (!) 146/104    Wt Readings from Last 3 Encounters:   05/30/19 81.6 kg (179 lb 12.8 oz)   05/16/19 85.2 kg (187 lb 14.4 oz)   03/03/17 88 kg (194 lb)                    Reviewed and updated as needed this visit by Provider         Review of Systems   ROS COMP: Constitutional, HEENT, cardiovascular, pulmonary, GI, , musculoskeletal, neuro, skin, endocrine and psych systems are negative, except as otherwise noted.      Objective    There were no vitals taken for this visit.  There is no height or weight on file to calculate BMI.  Physical Exam   GENERAL: healthy, alert and no distress  NECK: no adenopathy, no asymmetry, masses, or scars and trachea midline and normal to palpation  RESP: lungs clear to auscultation - no rales, rhonchi or wheezes  CV: regular rate and rhythm, normal S1 S2, no S3 or S4, no murmur, click or rub, no peripheral edema and peripheral pulses strong  MS: no gross musculoskeletal defects noted, no edema  NEURO: Normal strength and tone, mentation intact and speech normal  PSYCH: mentation appears normal, affect normal/bright    Diagnostic Test Results:  Labs reviewed in Epic  No results found for this or any previous visit (from the past 24 hour(s)).        Assessment & Plan     1. Hypertension goal BP  "(blood pressure) < 140/90  2. Hyperlipidemia LDL goal <130  He is making significant dietary and lifestyle changes.  His blood pressure is down to a more acceptable level.  He is lost a significant amount of weight and has a goal of being down to roughly 150 pounds.  This is an aggressive goal but I think it would be worthwhile.  He is to follow-up in August of this year for lab recheck and blood pressure recheck as well.       BMI:   Estimated body mass index is 29.14 kg/m  as calculated from the following:    Height as of this encounter: 1.673 m (5' 5.87\").    Weight as of this encounter: 81.6 kg (179 lb 12.8 oz).   Weight management plan: Discussed healthy diet and exercise guidelines    Return in about 3 months (around 8/30/2019) for Medication Recheck, recheck of current condition.    Demetris Duke PA-C  [facility]    "

## 2019-05-30 ENCOUNTER — OFFICE VISIT (OUTPATIENT)
Dept: FAMILY MEDICINE | Facility: OTHER | Age: 64
End: 2019-05-30
Payer: COMMERCIAL

## 2019-05-30 VITALS
WEIGHT: 179.8 LBS | RESPIRATION RATE: 16 BRPM | SYSTOLIC BLOOD PRESSURE: 132 MMHG | DIASTOLIC BLOOD PRESSURE: 88 MMHG | HEIGHT: 66 IN | TEMPERATURE: 96.7 F | BODY MASS INDEX: 28.9 KG/M2 | HEART RATE: 88 BPM

## 2019-05-30 DIAGNOSIS — E78.5 HYPERLIPIDEMIA LDL GOAL <130: ICD-10-CM

## 2019-05-30 DIAGNOSIS — I10 HYPERTENSION GOAL BP (BLOOD PRESSURE) < 140/90: Primary | ICD-10-CM

## 2019-05-30 PROCEDURE — 99213 OFFICE O/P EST LOW 20 MIN: CPT | Performed by: PHYSICIAN ASSISTANT

## 2019-05-30 RX ORDER — IRBESARTAN 300 MG/1
300 TABLET ORAL DAILY
Qty: 90 TABLET | Refills: 1 | Status: SHIPPED | OUTPATIENT
Start: 2019-05-30 | End: 2019-12-14

## 2019-05-30 ASSESSMENT — MIFFLIN-ST. JEOR: SCORE: 1546.19

## 2019-05-30 ASSESSMENT — PAIN SCALES - GENERAL: PAINLEVEL: NO PAIN (0)

## 2019-08-12 NOTE — PROGRESS NOTES
Subjective     Tomy Maldonado is a 64 year old male who presents to clinic today for the following health issues:    History of Present Illness        Hyperlipidemia:  He presents for follow up of hyperlipidemia.  He is taking medication to lower cholesterol. He is not having myalgia or other side effects to statin medications.He is not reporting shortness of breath, increased sweating or nausea with activity, left-sided neck or arm pain, chest pain or pressure, or pain in calves when walking 1-2 blocks.    Hypertension: He presents for follow up of hypertension.  He does not check blood pressure  regularly outside of the clinic. Outpatient blood pressures have not been over 140/90. He follows a low salt diet.     He eats 4 or more servings of fruits and vegetables daily.He consumes 0 sweetened beverage(s) daily.  He is taking medications regularly.     Patient Active Problem List   Diagnosis     Cervicalgia     Allergic rhinitis     Hyperlipidemia LDL goal <130     Tendinopathy of left rotator cuff     Hypertension goal BP (blood pressure) < 140/90     Past Surgical History:   Procedure Laterality Date     C NONSPECIFIC PROCEDURE      left ankle surgery     C REPAIR DETACH RETINA,SCLERAL BUCKLE  1973     COLONOSCOPY  2009    normal     HC KNEE SCOPE,MED/LAT MENISECTOMY      Left knee medial meniscectomy     HC REMOVAL TESTIS,SIMPLE  10/01/2007    Right inguinal orchiectomy.       Social History     Tobacco Use     Smoking status: Former Smoker     Packs/day: 0.50     Start date: 1964     Last attempt to quit: 2016     Years since quittin.8     Smokeless tobacco: Current User     Tobacco comment: 4 cigs per day   Substance Use Topics     Alcohol use: Yes     Comment: some     Family History   Problem Relation Age of Onset     Hypertension Mother      Hypertension Father      Diabetes Father         adult, late onset     Lipids Father         ?     Allergies Father      Allergies Paternal  Grandfather      Allergies Sister      Allergies Sister          Current Outpatient Medications   Medication Sig Dispense Refill     atorvastatin (LIPITOR) 40 MG tablet Take 1 tablet (40 mg) by mouth daily 90 tablet 1     irbesartan (AVAPRO) 300 MG tablet Take 1 tablet (300 mg) by mouth daily 90 tablet 1     TYLENOL PM EXTRA STRENGTH 500-25 MG OR TABS 1 TABLET AT BEDTIME AS NEEDED 0 0     ASPIRIN 81 MG OR TABS ONE DAILY 0 0     Allergies   Allergen Reactions     No Known Drug Allergies      Recent Labs   Lab Test 05/16/19  1133 05/13/19  1856 03/03/17  1308 12/22/16  1319   *  --   --  213*   HDL 76  --   --  59   TRIG 140  --   --  164*   ALT 57 51 41 32   CR 1.08 0.94 1.02 1.13   GFRESTIMATED 72 85 74 66   GFRESTBLACK 84 >90 90 80   POTASSIUM 5.0 4.0 4.9 4.7      BP Readings from Last 3 Encounters:   08/15/19 (!) (P) 152/92   05/30/19 132/88   05/16/19 (!) 150/106    Wt Readings from Last 3 Encounters:   08/15/19 80.5 kg (177 lb 6.4 oz)   05/30/19 81.6 kg (179 lb 12.8 oz)   05/16/19 85.2 kg (187 lb 14.4 oz)                    Reviewed and updated as needed this visit by Provider         Review of Systems   ROS COMP: CONSTITUTIONAL: NEGATIVE for fever, chills, change in weight  ENT/MOUTH: NEGATIVE for ear, mouth and throat problems  RESP: NEGATIVE for significant cough or SOB  CV: NEGATIVE for chest pain, palpitations or peripheral edema  GI: NEGATIVE for nausea, abdominal pain, heartburn, or change in bowel habits  MUSCULOSKELETAL: NEGATIVE for significant arthralgias or myalgia  NEURO: NEGATIVE for weakness, dizziness or paresthesias  PSYCHIATRIC: NEGATIVE for changes in mood or affect      Objective    BP (!) 142/96 (Cuff Size: Adult Regular)   Pulse 72   Temp 96.8  F (36  C) (Temporal)   Resp 16   Wt 80.5 kg (177 lb 6.4 oz)   BMI 28.75 kg/m    Body mass index is 28.75 kg/m .  Physical Exam   GENERAL: healthy, alert and no distress  NECK: no adenopathy, no asymmetry, masses, or scars and thyroid  "normal to palpation  RESP: lungs clear to auscultation - no rales, rhonchi or wheezes  CV: regular rate and rhythm, normal S1 S2, no S3 or S4, no murmur, click or rub, no peripheral edema and peripheral pulses strong  ABDOMEN: soft, nontender, no hepatosplenomegaly, no masses and bowel sounds normal  MS: no gross musculoskeletal defects noted, no edema  SKIN: no suspicious lesions or rashes to visible skin today  NEURO: Normal strength and tone, mentation intact and speech normal  PSYCH: mentation appears normal, affect normal/bright    Diagnostic Test Results:  Labs reviewed in Epic  No results found for this or any previous visit (from the past 24 hour(s)).        Assessment & Plan     1. Hypertension goal BP (blood pressure) < 140/90  He declines any further medication intervention at this point time and we offer him an opportunity to try to get it down on his own over the next 2 weeks.  Advised that he need to be returning to the clinic in 2 weeks for blood pressure check as I am not convinced that we will not have to add a medication to help with blood pressure.     BMI:   Estimated body mass index is 28.75 kg/m  as calculated from the following:    Height as of 5/30/19: 1.673 m (5' 5.87\").    Weight as of this encounter: 80.5 kg (177 lb 6.4 oz).   Weight management plan: Patient referred to endocrine and/or weight management specialty    Return in about 2 weeks (around 8/29/2019) for BP Recheck with provider, Medication Recheck, recheck of current condition.    Demetris Duke PA-C  [facility]    "

## 2019-08-15 ENCOUNTER — OFFICE VISIT (OUTPATIENT)
Dept: FAMILY MEDICINE | Facility: OTHER | Age: 64
End: 2019-08-15
Payer: COMMERCIAL

## 2019-08-15 VITALS
SYSTOLIC BLOOD PRESSURE: 142 MMHG | TEMPERATURE: 96.8 F | WEIGHT: 177.4 LBS | DIASTOLIC BLOOD PRESSURE: 96 MMHG | BODY MASS INDEX: 28.75 KG/M2 | HEART RATE: 72 BPM | RESPIRATION RATE: 16 BRPM

## 2019-08-15 DIAGNOSIS — I10 HYPERTENSION GOAL BP (BLOOD PRESSURE) < 140/90: Primary | ICD-10-CM

## 2019-08-15 PROCEDURE — 90715 TDAP VACCINE 7 YRS/> IM: CPT | Performed by: PHYSICIAN ASSISTANT

## 2019-08-15 PROCEDURE — 99213 OFFICE O/P EST LOW 20 MIN: CPT | Mod: 25 | Performed by: PHYSICIAN ASSISTANT

## 2019-08-15 PROCEDURE — 90471 IMMUNIZATION ADMIN: CPT | Performed by: PHYSICIAN ASSISTANT

## 2019-08-15 ASSESSMENT — PAIN SCALES - GENERAL: PAINLEVEL: NO PAIN (0)

## 2019-08-15 NOTE — NURSING NOTE
Screening Questionnaire for Adult Immunization    Are you sick today?   No   Do you have allergies to medications, food, a vaccine component or latex?   No   Have you ever had a serious reaction after receiving a vaccination?   No   Do you have a long-term health problem with heart disease, lung disease, asthma, kidney disease, metabolic disease (e.g. diabetes), anemia, or other blood disorder?   No   Do you have cancer, leukemia, HIV/AIDS, or any other immune system problem?   No   In the past 3 months, have you taken medications that affect  your immune system, such as prednisone, other steroids, or anticancer drugs; drugs for the treatment of rheumatoid arthritis, Crohn s disease, or psoriasis; or have you had radiation treatments?   No   Have you had a seizure, or a brain or other nervous system problem?   No   During the past year, have you received a transfusion of blood or blood     products, or been given immune (gamma) globulin or antiviral drug?   No   For women: Are you pregnant or is there a chance you could become        pregnant during the next month?   No   Have you received any vaccinations in the past 4 weeks?   No     Immunization questionnaire answers were all negative.        Per orders of Demetris Duke, injection of Tdap given by Ann Marie Brantley. Patient instructed to remain in clinic for 15 minutes afterwards, and to report any adverse reaction to me immediately.       Screening performed by Ann Marie Brantley on 8/15/2019 at 11:39 AM.

## 2019-08-22 NOTE — PROGRESS NOTES
Subjective     Tomy Maldonado is a 64 year old male who presents to clinic today for the following health issues:    History of Present Illness        Hypertension: He presents for follow up of hypertension.  He does not check blood pressure  regularly outside of the clinic. Outside blood pressures have been over 140/90. He follows a low salt diet.     He eats 4 or more servings of fruits and vegetables daily.He consumes 0 sweetened beverage(s) daily.  He is taking medications regularly.     Hyperlipidemia Follow-Up      Are you having any of the following symptoms? (Select all that apply)  No complaints of shortness of breath, chest pain or pressure.  No increased sweating or nausea with activity.  No left-sided neck or arm pain.  No complaints of pain in calves when walking 1-2 blocks.    Are you regularly taking any medication or supplement to lower your cholesterol?       Are you having muscle aches or other side effects that you think could be caused by your cholesterol lowering medication?  Yes- cramping in hands      Hypertension Follow-up      Do you check your blood pressure regularly outside of the clinic? Yes     Are you following a low salt diet? Yes    Are your blood pressures ever more than 140 on the top number (systolic) OR more   than 90 on the bottom number (diastolic), for example 140/90? Yes      How many servings of fruits and vegetables do you eat daily?  4 or more    On average, how many sweetened beverages do you drink each day (soda, juice, sweet tea, etc)?   0    How many days per week do you miss taking your medication? 0          Patient Active Problem List   Diagnosis     Cervicalgia     Allergic rhinitis     Hyperlipidemia LDL goal <130     Tendinopathy of left rotator cuff     Hypertension goal BP (blood pressure) < 140/90     Past Surgical History:   Procedure Laterality Date     C NONSPECIFIC PROCEDURE      left ankle surgery     C REPAIR DETACH RETINA,SCLERAL BUCKLE  1973      COLONOSCOPY  2009    normal     HC KNEE SCOPE,MED/LAT MENISECTOMY      Left knee medial meniscectomy     HC REMOVAL TESTIS,SIMPLE  10/01/2007    Right inguinal orchiectomy.       Social History     Tobacco Use     Smoking status: Former Smoker     Packs/day: 0.50     Start date: 1964     Last attempt to quit: 2016     Years since quittin.9     Smokeless tobacco: Current User     Tobacco comment: 4 cigs per day   Substance Use Topics     Alcohol use: Yes     Comment: some     Family History   Problem Relation Age of Onset     Hypertension Mother      Hypertension Father      Diabetes Father         adult, late onset     Lipids Father         ?     Allergies Father      Allergies Paternal Grandfather      Allergies Sister      Allergies Sister          Current Outpatient Medications   Medication Sig Dispense Refill     atorvastatin (LIPITOR) 40 MG tablet Take 1 tablet (40 mg) by mouth daily 90 tablet 1     irbesartan (AVAPRO) 300 MG tablet Take 1 tablet (300 mg) by mouth daily 90 tablet 1     TYLENOL PM EXTRA STRENGTH 500-25 MG OR TABS 1 TABLET AT BEDTIME AS NEEDED 0 0     ASPIRIN 81 MG OR TABS ONE DAILY 0 0     Allergies   Allergen Reactions     No Known Drug Allergies      Recent Labs   Lab Test 19  1133 19  1856 17  1308 16  1319   *  --   --  213*   HDL 76  --   --  59   TRIG 140  --   --  164*   ALT 57 51 41 32   CR 1.08 0.94 1.02 1.13   GFRESTIMATED 72 85 74 66   GFRESTBLACK 84 >90 90 80   POTASSIUM 5.0 4.0 4.9 4.7      BP Readings from Last 3 Encounters:   19 134/82   08/15/19 (!) (P) 152/92   19 132/88    Wt Readings from Last 3 Encounters:   19 75.8 kg (167 lb)   08/15/19 80.5 kg (177 lb 6.4 oz)   19 81.6 kg (179 lb 12.8 oz)                    Reviewed and updated as needed this visit by Provider         Review of Systems   ROS COMP: Constitutional, HEENT, cardiovascular, pulmonary, GI, , musculoskeletal, neuro, skin, endocrine and  "psych systems are negative, except as otherwise noted.      Objective    /82   Pulse 72   Temp 97.8  F (36.6  C) (Temporal)   Resp 16   Ht 1.676 m (5' 6\")   Wt 75.8 kg (167 lb)   SpO2 99%   BMI 26.95 kg/m    Body mass index is 26.95 kg/m .  Physical Exam   GENERAL: healthy, alert and no distress  NECK: no adenopathy, no asymmetry, masses, or scars and trachea midline and normal to palpation  RESP: lungs clear to auscultation - no rales, rhonchi or wheezes  CV: regular rate and rhythm, normal S1 S2, no S3 or S4, no murmur, click or rub, no peripheral edema and peripheral pulses strong  ABDOMEN: soft, nontender, no hepatosplenomegaly, no masses and bowel sounds normal  MS: no gross musculoskeletal defects noted, no edema  SKIN: no suspicious lesions or rashes to visible skin  NEURO: Normal strength and tone, mentation intact and speech normal  PSYCH: mentation appears normal, affect normal/bright    Diagnostic Test Results:  Labs reviewed in Epic  Results for orders placed or performed in visit on 05/16/19   Comprehensive metabolic panel   Result Value Ref Range    Sodium 142 133 - 144 mmol/L    Potassium 5.0 3.4 - 5.3 mmol/L    Chloride 106 94 - 109 mmol/L    Carbon Dioxide 26 20 - 32 mmol/L    Anion Gap 10 3 - 14 mmol/L    Glucose 102 (H) 70 - 99 mg/dL    Urea Nitrogen 18 7 - 30 mg/dL    Creatinine 1.08 0.66 - 1.25 mg/dL    GFR Estimate 72 >60 mL/min/[1.73_m2]    GFR Estimate If Black 84 >60 mL/min/[1.73_m2]    Calcium 10.0 8.5 - 10.1 mg/dL    Bilirubin Total 0.8 0.2 - 1.3 mg/dL    Albumin 4.5 3.4 - 5.0 g/dL    Protein Total 8.2 6.8 - 8.8 g/dL    Alkaline Phosphatase 79 40 - 150 U/L    ALT 57 0 - 70 U/L    AST 27 0 - 45 U/L   Lipid panel reflex to direct LDL Fasting   Result Value Ref Range    Cholesterol 362 (H) <200 mg/dL    Triglycerides 140 <150 mg/dL    HDL Cholesterol 76 >39 mg/dL    LDL Cholesterol Calculated 258 (H) <100 mg/dL    Non HDL Cholesterol 286 (H) <130 mg/dL   PSA, screen   Result " "Value Ref Range    PSA 1.06 0 - 4 ug/L   Albumin Random Urine Quantitative with Creat Ratio   Result Value Ref Range    Creatinine Urine 305 mg/dL    Albumin Urine mg/L 36 mg/L    Albumin Urine mg/g Cr 11.74 0 - 17 mg/g Cr           Assessment & Plan     1. Hyperlipidemia LDL goal <130  - Comprehensive metabolic panel  - Lipid panel reflex to direct LDL Fasting    2. Hypertension goal BP (blood pressure) < 140/90  - Comprehensive metabolic panel  - Lipid panel reflex to direct LDL Fasting     BMI:   Estimated body mass index is 26.95 kg/m  as calculated from the following:    Height as of this encounter: 1.676 m (5' 6\").    Weight as of this encounter: 75.8 kg (167 lb).     Work on weight loss  Regular exercise  We will base any changes that we must make to his cholesterol on his overall lab values.    Return in about 3 months (around 11/29/2019) for Medication Recheck, recheck of current condition.    Demetris Duke PA-C  [facility]    "

## 2019-08-23 ENCOUNTER — TELEPHONE (OUTPATIENT)
Dept: FAMILY MEDICINE | Facility: OTHER | Age: 64
End: 2019-08-23

## 2019-08-23 DIAGNOSIS — E78.5 HYPERLIPIDEMIA LDL GOAL <130: Primary | ICD-10-CM

## 2019-08-23 NOTE — TELEPHONE ENCOUNTER
Panel Management Review      Patient has the following on his problem list:       IVD   ASA: Passed    Last LDL:    Lab Results   Component Value Date    CHOL 362 05/16/2019     Lab Results   Component Value Date    HDL 76 05/16/2019     Lab Results   Component Value Date     05/16/2019     Lab Results   Component Value Date    TRIG 140 05/16/2019        Lab Results   Component Value Date    CHOLHDLRATIO 5.0 09/11/2007        Is the patient on a Statin? YES   Is the patient on Aspirin? YES                  Medications     HMG CoA Reductase Inhibitors     atorvastatin (LIPITOR) 40 MG tablet       Salicylates     ASPIRIN 81 MG OR TABS             Last three blood pressure readings:  BP Readings from Last 3 Encounters:   08/15/19 (!) (P) 152/92   05/30/19 132/88   05/16/19 (!) 150/106        Tobacco History:     History   Smoking Status     Former Smoker     Packs/day: 0.50     Start date: 12/22/1964     Quit date: 9/22/2016   Smokeless Tobacco     Current User     Comment: 4 cigs per day       Hypertension   Last three blood pressure readings:  BP Readings from Last 3 Encounters:   08/15/19 (!) (P) 152/92   05/30/19 132/88   05/16/19 (!) 150/106     Blood pressure: FAILED    HTN Guidelines:  Less than 140/90      Composite cancer screening  Chart review shows that this patient is due/due soon for the following None  Summary:    Patient is due/failing the following:   HIV screening and LDL    Action needed:   Patient needs fasting lab only appointment    Type of outreach:    Phone, left message for patient to call back.     Questions for provider review:    None                                                                                                                                    Angela Ferrari MA        Chart routed to Care Team .

## 2019-08-29 ENCOUNTER — OFFICE VISIT (OUTPATIENT)
Dept: FAMILY MEDICINE | Facility: OTHER | Age: 64
End: 2019-08-29
Payer: COMMERCIAL

## 2019-08-29 VITALS
RESPIRATION RATE: 16 BRPM | BODY MASS INDEX: 26.84 KG/M2 | WEIGHT: 167 LBS | SYSTOLIC BLOOD PRESSURE: 134 MMHG | TEMPERATURE: 97.8 F | HEART RATE: 72 BPM | OXYGEN SATURATION: 99 % | DIASTOLIC BLOOD PRESSURE: 82 MMHG | HEIGHT: 66 IN

## 2019-08-29 DIAGNOSIS — E78.5 HYPERLIPIDEMIA LDL GOAL <130: Primary | ICD-10-CM

## 2019-08-29 DIAGNOSIS — I10 HYPERTENSION GOAL BP (BLOOD PRESSURE) < 140/90: ICD-10-CM

## 2019-08-29 LAB
ALBUMIN SERPL-MCNC: 4.3 G/DL (ref 3.4–5)
ALP SERPL-CCNC: 96 U/L (ref 40–150)
ALT SERPL W P-5'-P-CCNC: 46 U/L (ref 0–70)
ANION GAP SERPL CALCULATED.3IONS-SCNC: 8 MMOL/L (ref 3–14)
AST SERPL W P-5'-P-CCNC: 23 U/L (ref 0–45)
BILIRUB SERPL-MCNC: 0.8 MG/DL (ref 0.2–1.3)
BUN SERPL-MCNC: 17 MG/DL (ref 7–30)
CALCIUM SERPL-MCNC: 9.9 MG/DL (ref 8.5–10.1)
CHLORIDE SERPL-SCNC: 102 MMOL/L (ref 94–109)
CHOLEST SERPL-MCNC: 190 MG/DL
CO2 SERPL-SCNC: 28 MMOL/L (ref 20–32)
CREAT SERPL-MCNC: 1.12 MG/DL (ref 0.66–1.25)
GFR SERPL CREATININE-BSD FRML MDRD: 69 ML/MIN/{1.73_M2}
GLUCOSE SERPL-MCNC: 101 MG/DL (ref 70–99)
HDLC SERPL-MCNC: 65 MG/DL
LDLC SERPL CALC-MCNC: 97 MG/DL
NONHDLC SERPL-MCNC: 125 MG/DL
POTASSIUM SERPL-SCNC: 4.6 MMOL/L (ref 3.4–5.3)
PROT SERPL-MCNC: 8.3 G/DL (ref 6.8–8.8)
SODIUM SERPL-SCNC: 138 MMOL/L (ref 133–144)
TRIGL SERPL-MCNC: 142 MG/DL

## 2019-08-29 PROCEDURE — 80053 COMPREHEN METABOLIC PANEL: CPT | Performed by: PHYSICIAN ASSISTANT

## 2019-08-29 PROCEDURE — 99214 OFFICE O/P EST MOD 30 MIN: CPT | Performed by: PHYSICIAN ASSISTANT

## 2019-08-29 PROCEDURE — 80061 LIPID PANEL: CPT | Performed by: PHYSICIAN ASSISTANT

## 2019-08-29 PROCEDURE — 36415 COLL VENOUS BLD VENIPUNCTURE: CPT | Performed by: PHYSICIAN ASSISTANT

## 2019-08-29 ASSESSMENT — MIFFLIN-ST. JEOR: SCORE: 1490.26

## 2019-09-28 ENCOUNTER — HEALTH MAINTENANCE LETTER (OUTPATIENT)
Age: 64
End: 2019-09-28

## 2019-11-12 DIAGNOSIS — E78.5 HYPERLIPIDEMIA, UNSPECIFIED HYPERLIPIDEMIA TYPE: ICD-10-CM

## 2019-11-13 RX ORDER — ATORVASTATIN CALCIUM 40 MG/1
TABLET, FILM COATED ORAL
Qty: 90 TABLET | Refills: 2 | Status: SHIPPED | OUTPATIENT
Start: 2019-11-13 | End: 2020-08-27

## 2019-11-13 NOTE — TELEPHONE ENCOUNTER
Prescription approved per [facility] Refill Protocol.    Macrina Boo, RN, BSN

## 2019-11-14 ENCOUNTER — MYC REFILL (OUTPATIENT)
Dept: FAMILY MEDICINE | Facility: OTHER | Age: 64
End: 2019-11-14

## 2019-11-14 DIAGNOSIS — E78.5 HYPERLIPIDEMIA, UNSPECIFIED HYPERLIPIDEMIA TYPE: ICD-10-CM

## 2019-11-14 RX ORDER — ATORVASTATIN CALCIUM 40 MG/1
40 TABLET, FILM COATED ORAL DAILY
Qty: 90 TABLET | Refills: 2 | OUTPATIENT
Start: 2019-11-14

## 2019-12-06 NOTE — PROGRESS NOTES
Subjective     Tomy Maldonado is a 64 year old male who presents to clinic today for the following health issues:    History of Present Illness        Hyperlipidemia:  He presents for follow up of hyperlipidemia.  He is taking medication to lower cholesterol. He is having myalgia or other side effects to statin medications.    Hypertension: He presents for follow up of hypertension.  He does not check blood pressure  regularly outside of the clinic. Outside blood pressures have been over 140/90. He follows a low salt diet.     He eats 4 or more servings of fruits and vegetables daily.He consumes 0 sweetened beverage(s) daily.  He is taking medications regularly.     Patient Active Problem List   Diagnosis     Cervicalgia     Allergic rhinitis     Hyperlipidemia LDL goal <130     Tendinopathy of left rotator cuff     Hypertension goal BP (blood pressure) < 140/90     Past Surgical History:   Procedure Laterality Date     C NONSPECIFIC PROCEDURE      left ankle surgery     C REPAIR DETACH RETINA,SCLERAL BUCKLE  1973     COLONOSCOPY  12/18/2009    normal     HC KNEE SCOPE,MED/LAT MENISECTOMY      Left knee medial meniscectomy     HC REMOVAL TESTIS,SIMPLE  10/01/2007    Right inguinal orchiectomy.       Social History     Tobacco Use     Smoking status: Former Smoker     Packs/day: 0.50     Start date: 12/22/1964     Last attempt to quit: 9/22/2016     Years since quitting: 3.2     Smokeless tobacco: Current User     Tobacco comment: 4 cigs per day   Substance Use Topics     Alcohol use: Yes     Comment: some     Family History   Problem Relation Age of Onset     Hypertension Mother      Hypertension Father      Diabetes Father         adult, late onset     Lipids Father         ?     Allergies Father      Allergies Paternal Grandfather      Allergies Sister      Allergies Sister          Current Outpatient Medications   Medication Sig Dispense Refill     ASPIRIN 81 MG OR TABS ONE DAILY 0 0     atorvastatin (LIPITOR)  40 MG tablet TAKE 1 TABLET BY MOUTH ONCE DAILY 90 tablet 2     irbesartan (AVAPRO) 300 MG tablet Take 1 tablet (300 mg) by mouth daily 90 tablet 1     triamterene-HCTZ (DYAZIDE) 37.5-25 MG capsule Take 1 capsule by mouth every morning 90 capsule 1     Allergies   Allergen Reactions     No Known Drug Allergies      Recent Labs   Lab Test 08/29/19  1118 05/16/19  1133 05/13/19  1856  12/22/16  1319   LDL 97 258*  --   --  213*   HDL 65 76  --   --  59   TRIG 142 140  --   --  164*   ALT 46 57 51   < > 32   CR 1.12 1.08 0.94   < > 1.13   GFRESTIMATED 69 72 85   < > 66   GFRESTBLACK 80 84 >90   < > 80   POTASSIUM 4.6 5.0 4.0   < > 4.7    < > = values in this interval not displayed.      BP Readings from Last 3 Encounters:   12/12/19 (!) 142/88   08/29/19 134/82   08/15/19 (!) (P) 152/92    Wt Readings from Last 3 Encounters:   12/12/19 78.3 kg (172 lb 9.6 oz)   08/29/19 75.8 kg (167 lb)   08/15/19 80.5 kg (177 lb 6.4 oz)                    Reviewed and updated as needed this visit by Provider         Review of Systems   ROS COMP: Constitutional, HEENT, cardiovascular, pulmonary, GI, , musculoskeletal, neuro, skin, endocrine and psych systems are negative, except as otherwise noted.      Objective    BP (!) 142/88   Pulse 84   Temp 97  F (36.1  C) (Temporal)   Resp 16   Wt 78.3 kg (172 lb 9.6 oz)   BMI 27.86 kg/m    Body mass index is 27.86 kg/m .  Physical Exam   GENERAL: healthy, alert and no distress  NECK: no adenopathy, no asymmetry, masses, or scars and thyroid normal to palpation  RESP: lungs clear to auscultation - no rales, rhonchi or wheezes  CV: regular rate and rhythm, normal S1 S2, no S3 or S4, no murmur, click or rub, no peripheral edema and peripheral pulses strong  ABDOMEN: soft, nontender, no hepatosplenomegaly, no masses and bowel sounds normal  MS: no gross musculoskeletal defects noted, no edema    Diagnostic Test Results:  Labs reviewed in Epic  No results found for this or any previous visit  (from the past 24 hour(s)).        Assessment & Plan     1. Hypertension goal BP (blood pressure) < 140/90  Added diuretic to the mix and advised lifestyle modifications.  - triamterene-HCTZ (DYAZIDE) 37.5-25 MG capsule; Take 1 capsule by mouth every morning  Dispense: 90 capsule; Refill: 1     Return in about 4 weeks (around 1/9/2020) for Medication Recheck, recheck of current condition.    Demetris Duke PA-C  [facility]

## 2019-12-12 ENCOUNTER — OFFICE VISIT (OUTPATIENT)
Dept: FAMILY MEDICINE | Facility: OTHER | Age: 64
End: 2019-12-12
Payer: COMMERCIAL

## 2019-12-12 VITALS
RESPIRATION RATE: 16 BRPM | TEMPERATURE: 97 F | BODY MASS INDEX: 27.86 KG/M2 | SYSTOLIC BLOOD PRESSURE: 142 MMHG | WEIGHT: 172.6 LBS | HEART RATE: 84 BPM | DIASTOLIC BLOOD PRESSURE: 88 MMHG

## 2019-12-12 DIAGNOSIS — I10 HYPERTENSION GOAL BP (BLOOD PRESSURE) < 140/90: Primary | ICD-10-CM

## 2019-12-12 PROCEDURE — 99213 OFFICE O/P EST LOW 20 MIN: CPT | Performed by: PHYSICIAN ASSISTANT

## 2019-12-12 RX ORDER — TRIAMTERENE AND HYDROCHLOROTHIAZIDE 37.5; 25 MG/1; MG/1
1 CAPSULE ORAL EVERY MORNING
Qty: 90 CAPSULE | Refills: 1 | Status: SHIPPED | OUTPATIENT
Start: 2019-12-12 | End: 2021-01-14

## 2019-12-12 ASSESSMENT — PAIN SCALES - GENERAL: PAINLEVEL: MILD PAIN (3)

## 2019-12-14 DIAGNOSIS — I10 HYPERTENSION GOAL BP (BLOOD PRESSURE) < 140/90: Primary | ICD-10-CM

## 2019-12-16 RX ORDER — IRBESARTAN 300 MG/1
TABLET ORAL
Qty: 90 TABLET | Refills: 1 | Status: SHIPPED | OUTPATIENT
Start: 2019-12-16 | End: 2020-06-11

## 2019-12-16 NOTE — TELEPHONE ENCOUNTER
Reason for Call:  Medication or medication refill:    Do you use a [facility]?  Name of the pharmacy and phone number for the current request:  [facility] - [phone] (Fax [phone])    Name of the medication requested: irbesartan 300 mg    Other request: patient took his last one today. Pharmacy has faxed request    Can we leave a detailed message on this number? YES    Phone number patient can be reached at: Home number on file [phone] (home)    Best Time: any    Call taken on 12/16/2019 at 10:18 AM by Leanne Briones

## 2020-01-27 ENCOUNTER — TELEPHONE (OUTPATIENT)
Dept: FAMILY MEDICINE | Facility: OTHER | Age: 65
End: 2020-01-27

## 2020-01-27 NOTE — TELEPHONE ENCOUNTER
I would recommend consideration of ENT in [place].  We should probably take a look at this to make the referral.  Electronically signed:    Demetris Duke PA-C

## 2020-01-27 NOTE — TELEPHONE ENCOUNTER
Reason for Call:  Other call back     Detailed comments: periodontist, they recommended a biopsie of a lump in his mouth. They are not sure where or who should do this? Asking for guidance.     Phone Number Patient can be reached at: Other phone number:  [phone]    Best Time: any    Can we leave a detailed message on this number? YES    Call taken on 1/27/2020 at 4:49 PM by Lupe Butts

## 2020-01-28 NOTE — PROGRESS NOTES
Subjective     Tomy Maldonado is a 64 year old male who presents to clinic today for the following health issues:    History of Present Illness        He eats 4 or more servings of fruits and vegetables daily.He consumes 0 sweetened beverage(s) daily.He exercises with enough effort to increase his heart rate 9 or less minutes per day.  He exercises with enough effort to increase his heart rate 5 days per week.   He is taking medications regularly.     Concern - Spot in mouth  Onset: 1 year    Description:   Patient reports he has a spot that was concerning to the dentist in the left lower lip.    Intensity: mild    Progression of Symptoms:  worsening    Patient Active Problem List   Diagnosis     Cervicalgia     Allergic rhinitis     Hyperlipidemia LDL goal <130     Tendinopathy of left rotator cuff     Hypertension goal BP (blood pressure) < 140/90     Past Surgical History:   Procedure Laterality Date     C NONSPECIFIC PROCEDURE      left ankle surgery     C REPAIR DETACH RETINA,SCLERAL BUCKLE  1973     COLONOSCOPY  12/18/2009    normal     HC KNEE SCOPE,MED/LAT MENISECTOMY      Left knee medial meniscectomy     HC REMOVAL TESTIS,SIMPLE  10/01/2007    Right inguinal orchiectomy.       Social History     Tobacco Use     Smoking status: Former Smoker     Packs/day: 0.50     Start date: 12/22/1964     Last attempt to quit: 9/22/2016     Years since quitting: 3.3     Smokeless tobacco: Current User     Tobacco comment: 4 cigs per day   Substance Use Topics     Alcohol use: Yes     Comment: some     Family History   Problem Relation Age of Onset     Hypertension Mother      Hypertension Father      Diabetes Father         adult, late onset     Lipids Father         ?     Allergies Father      Allergies Paternal Grandfather      Allergies Sister      Allergies Sister          Current Outpatient Medications   Medication Sig Dispense Refill     ASPIRIN 81 MG OR TABS ONE DAILY 0 0     atorvastatin (LIPITOR) 40 MG tablet  "TAKE 1 TABLET BY MOUTH ONCE DAILY 90 tablet 2     irbesartan (AVAPRO) 300 MG tablet TAKE 1 TABLET BY MOUTH ONCE DAILY 90 tablet 1     metoprolol succinate ER (TOPROL-XL) 25 MG 24 hr tablet Take 1 tablet (25 mg) by mouth daily 30 tablet 1     triamterene-HCTZ (DYAZIDE) 37.5-25 MG capsule Take 1 capsule by mouth every morning 90 capsule 1     Allergies   Allergen Reactions     No Known Drug Allergies      Recent Labs   Lab Test 08/29/19  1118 05/16/19  1133 05/13/19  1856  12/22/16  1319   LDL 97 258*  --   --  213*   HDL 65 76  --   --  59   TRIG 142 140  --   --  164*   ALT 46 57 51   < > 32   CR 1.12 1.08 0.94   < > 1.13   GFRESTIMATED 69 72 85   < > 66   GFRESTBLACK 80 84 >90   < > 80   POTASSIUM 4.6 5.0 4.0   < > 4.7    < > = values in this interval not displayed.      BP Readings from Last 3 Encounters:   01/30/20 (!) 162/94   12/12/19 (!) 142/88   08/29/19 134/82    Wt Readings from Last 3 Encounters:   01/30/20 84.3 kg (185 lb 12.8 oz)   12/12/19 78.3 kg (172 lb 9.6 oz)   08/29/19 75.8 kg (167 lb)                    Reviewed and updated as needed this visit by Provider         Review of Systems   ROS COMP: Constitutional, HEENT, cardiovascular, pulmonary, GI, , musculoskeletal, neuro, skin, endocrine and psych systems are negative, except as otherwise noted.      Objective    BP (!) 160/110   Pulse 92   Temp 97.8  F (36.6  C) (Temporal)   Resp 16   Ht 1.676 m (5' 6\")   Wt 84.3 kg (185 lb 12.8 oz)   BMI 29.99 kg/m    Body mass index is 29.99 kg/m .  Physical Exam   GENERAL: healthy, alert and no distress  HENT: ear canals and TM's normal, nose and mouth without ulcers or lesions  NECK: no adenopathy, no asymmetry, masses, or scars and thyroid normal to palpation  RESP: lungs clear to auscultation - no rales, rhonchi or wheezes  CV: regular rate and rhythm, normal S1 S2, no S3 or S4, no murmur, click or rub, no peripheral edema and peripheral pulses strong  ABDOMEN: soft, nontender, no " hepatosplenomegaly, no masses and bowel sounds normal  MS: no gross musculoskeletal defects noted, no edema  SKIN: no suspicious lesions or rashes to visible skin  NEURO: Normal strength and tone, sensory exam grossly normal and mentation intact  PSYCH: anxious, judgement and insight intact and appearance well groomed    Diagnostic Test Results:  Labs reviewed in Epic  No results found for this or any previous visit (from the past 24 hour(s)).        Assessment & Plan     1. Screen for colon cancer  - Fecal colorectal cancer screen FIT - Future (S+30); Future    2. Lip lesion  I suspect this to be scar tissue that he has developed from biting his lip.  He states that approximately a year ago he bit this area of his lip when he was chewing gum and continues to catch it from time to time.  Will have ear nose and throat evaluate this and consider biopsy.  - OTOLARYNGOLOGY REFERRAL    3. Hypertension goal BP (blood pressure) < 140/90  Add beta-blocker and observe.  Follow-up in 4 weeks.  - metoprolol succinate ER (TOPROL-XL) 25 MG 24 hr tablet; Take 1 tablet (25 mg) by mouth daily  Dispense: 30 tablet; Refill: 1    Return in about 4 weeks (around 2/27/2020) for BP Recheck with provider, Medication Recheck, recheck of current condition.    Demetris Duke PA-C  [facility]

## 2020-01-30 ENCOUNTER — OFFICE VISIT (OUTPATIENT)
Dept: FAMILY MEDICINE | Facility: OTHER | Age: 65
End: 2020-01-30
Payer: COMMERCIAL

## 2020-01-30 VITALS
HEART RATE: 92 BPM | DIASTOLIC BLOOD PRESSURE: 94 MMHG | TEMPERATURE: 97.8 F | SYSTOLIC BLOOD PRESSURE: 162 MMHG | BODY MASS INDEX: 29.86 KG/M2 | WEIGHT: 185.8 LBS | RESPIRATION RATE: 16 BRPM | HEIGHT: 66 IN

## 2020-01-30 DIAGNOSIS — Z12.11 SCREEN FOR COLON CANCER: Primary | ICD-10-CM

## 2020-01-30 DIAGNOSIS — K13.0 LIP LESION: ICD-10-CM

## 2020-01-30 DIAGNOSIS — I10 HYPERTENSION GOAL BP (BLOOD PRESSURE) < 140/90: ICD-10-CM

## 2020-01-30 PROCEDURE — 99214 OFFICE O/P EST MOD 30 MIN: CPT | Performed by: PHYSICIAN ASSISTANT

## 2020-01-30 RX ORDER — METOPROLOL SUCCINATE 25 MG/1
25 TABLET, EXTENDED RELEASE ORAL DAILY
Qty: 30 TABLET | Refills: 1 | Status: SHIPPED | OUTPATIENT
Start: 2020-01-30 | End: 2020-04-02

## 2020-01-30 ASSESSMENT — PAIN SCALES - GENERAL: PAINLEVEL: MILD PAIN (2)

## 2020-01-30 ASSESSMENT — MIFFLIN-ST. JEOR: SCORE: 1575.53

## 2020-02-21 NOTE — PROGRESS NOTES
ENT Consultation    Tomy Maldonado who is a 64 year old male seen in consultation at the request of self.      History of Present Illness - Tomy Maldonado is a 64 year old male presents for evaluation of his apparent lesion on the lower lip.  He said he bit his lip a while ago.  He noticed the lump.  Since then it has not been bothering him.  He denies any bleeding ulceration.  Patient is not a smoker does not chew tobacco.  He is with his periodontist who was suggested that this should be looked at.      Past Medical History -   Past Medical History:   Diagnosis Date     Cervicalgia 1/14/2007       Current Medications -   Current Outpatient Medications:      ASPIRIN 81 MG OR TABS, ONE DAILY, Disp: 0, Rfl: 0     atorvastatin (LIPITOR) 40 MG tablet, TAKE 1 TABLET BY MOUTH ONCE DAILY, Disp: 90 tablet, Rfl: 2     irbesartan (AVAPRO) 300 MG tablet, TAKE 1 TABLET BY MOUTH ONCE DAILY, Disp: 90 tablet, Rfl: 1     metoprolol succinate ER (TOPROL-XL) 25 MG 24 hr tablet, Take 1 tablet (25 mg) by mouth daily, Disp: 30 tablet, Rfl: 1     triamterene-HCTZ (DYAZIDE) 37.5-25 MG capsule, Take 1 capsule by mouth every morning, Disp: 90 capsule, Rfl: 1    Allergies -   Allergies   Allergen Reactions     No Known Drug Allergies        Social History -   Social History     Socioeconomic History     Marital status:      Spouse name: Nunu     Number of children: 3     Years of education: Not on file     Highest education level: Not on file   Occupational History     Occupation:      Employer: [employer]   Social Needs     Financial resource strain: Not on file     Food insecurity:     Worry: Not on file     Inability: Not on file     Transportation needs:     Medical: Not on file     Non-medical: Not on file   Tobacco Use     Smoking status: Former Smoker     Packs/day: 0.50     Start date: 12/22/1964     Last attempt to quit: 9/22/2016     Years since quitting: 3.4     Smokeless tobacco: Current User     Tobacco  comment: 4 cigs per day   Substance and Sexual Activity     Alcohol use: Yes     Comment: some     Drug use: No     Sexual activity: Yes     Partners: Female   Lifestyle     Physical activity:     Days per week: Not on file     Minutes per session: Not on file     Stress: Not on file   Relationships     Social connections:     Talks on phone: Not on file     Gets together: Not on file     Attends Denominational service: Not on file     Active member of club or organization: Not on file     Attends meetings of clubs or organizations: Not on file     Relationship status: Not on file     Intimate partner violence:     Fear of current or ex partner: Not on file     Emotionally abused: Not on file     Physically abused: Not on file     Forced sexual activity: Not on file   Other Topics Concern      Service Not Asked     Blood Transfusions Not Asked     Caffeine Concern Yes     Comment: one cup of coffee a day     Occupational Exposure Not Asked     Hobby Hazards Not Asked     Sleep Concern Not Asked     Stress Concern Not Asked     Weight Concern Not Asked     Special Diet Not Asked     Back Care Not Asked     Exercise Yes     Comment: 2-3 hours a week: bikes     Bike Helmet Not Asked     Seat Belt Yes     Self-Exams Not Asked     Parent/sibling w/ CABG, MI or angioplasty before 65F 55M? Not Asked   Social History Narrative     Not on file       Family History -   Family History   Problem Relation Age of Onset     Hypertension Mother      Hypertension Father      Diabetes Father         adult, late onset     Lipids Father         ?     Allergies Father      Allergies Paternal Grandfather      Allergies Sister      Allergies Sister        Review of Systems - As per HPI and PMHx, otherwise review of system review of the head and neck negative. Otherwise 10+ review of system is negative    Physical Exam  There were no vitals taken for this visit.  BMI: There is no height or weight on file to calculate BMI.    General -  The patient is well nourished and well developed, and appears to have good nutritional status.  Alert and oriented to person and place, answers questions and cooperates with examination appropriately.    SKIN - No suspicious lesions or rashes.  Respiration - No respiratory distress.  Head and Face - Normocephalic and atraumatic, with no gross asymmetry noted of the contour of the facial features.  The facial nerve is intact, with strong symmetric movements.    Voice and Breathing - The patient was breathing comfortably without the use of accessory muscles. The patients voice was clear and strong, and had appropriate pitch and quality.    Ears - Bilateral pinna and EACs with normal appearing overlying skin. Tympanic membrane intact with good mobility on pneumatic otoscopy bilaterally. Bony landmarks of the ossicular chain are normal. The tympanic membranes are normal in appearance. No retraction, perforation, or masses.  No fluid or purulence was seen in the external canal or the middle ear.     Eyes - Extraocular movements intact.  Sclera were not icteric or injected, conjunctiva were pink and moist.    Mouth - Examination of the oral cavity showed pink, healthy oral mucosa.  Raised left lower inner lip area with some organization below the surface.  No ulceration of the mucosa noted.  The tongue was mobile and midline, and the dentition were in good condition.      Throat - The walls of the oropharynx were smooth, pink, moist, symmetric, and had no lesions or ulcerations.  The tonsillar pillars and soft palate were symmetric.  The uvula was midline on elevation.    Neck - Normal midline excursion of the laryngotracheal complex during swallowing.  Full range of motion on passive movement.  Palpation of the occipital, submental, submandibular, internal jugular chain, and supraclavicular nodes did not demonstrate any abnormal lymph nodes or masses.  The carotid pulse was palpable bilaterally.  Palpation of the thyroid  was soft and smooth, with no nodules or goiter appreciated.  The trachea was mobile and midline.    Nose - External contour is symmetric, no gross deflection or scars.  Nasal mucosa is pink and moist with no abnormal mucus.  The septum was midline and non-obstructive, turbinates of normal size and position.  No polyps, masses, or purulence noted on examination.    Neuro - Nonfocal neuro exam is normal, CN 2 through 12 intact, normal gait and muscle tone.      Performed in clinic today:   excision biopsy left lower lip lesion topical anesthesia was performed with the Cetacaine followed by local anesthesia using 0.3 mL's 1% lidocaine 1-100,000 epinephrine.  After local anesthetic was adequate we used Adson-Brown forceps to engage the lesion and plastic scissors to remove it.  It was completely removed.  Control hemostasis with silver nitrate.  Patient tolerated procedure well.      A/P - Tomy Maldonado is a 64 year old male with lower lip lesion that was removed successfully.  We shall await the results of pathology.  In the meantime he is to avoid spicy foods keep the area clean with saline washes.  He will see me back in 2 weeks in follow-up.      Morgan Brown MD

## 2020-02-24 ENCOUNTER — OFFICE VISIT (OUTPATIENT)
Dept: OTOLARYNGOLOGY | Facility: CLINIC | Age: 65
End: 2020-02-24
Payer: COMMERCIAL

## 2020-02-24 VITALS
SYSTOLIC BLOOD PRESSURE: 142 MMHG | HEIGHT: 66 IN | WEIGHT: 187.7 LBS | BODY MASS INDEX: 30.17 KG/M2 | DIASTOLIC BLOOD PRESSURE: 88 MMHG | TEMPERATURE: 97.7 F

## 2020-02-24 DIAGNOSIS — K13.0 LIP LESION: Primary | ICD-10-CM

## 2020-02-24 PROCEDURE — 40490 BIOPSY OF LIP: CPT | Performed by: OTOLARYNGOLOGY

## 2020-02-24 PROCEDURE — 88305 TISSUE EXAM BY PATHOLOGIST: CPT | Performed by: OTOLARYNGOLOGY

## 2020-02-24 PROCEDURE — 99203 OFFICE O/P NEW LOW 30 MIN: CPT | Mod: 25 | Performed by: OTOLARYNGOLOGY

## 2020-02-24 ASSESSMENT — MIFFLIN-ST. JEOR: SCORE: 1584.15

## 2020-02-24 NOTE — LETTER
2/24/2020         RE: Tomy Maldonado  [address]  [facility] [address]        Dear Colleague,    Thank you for referring your patient, Tomy Maldonado, to the [facility]. Please see a copy of my visit note below.    ENT Consultation    Tomy Maldonado who is a 64 year old male seen in consultation at the request of self.      History of Present Illness - Tomy Maldonado is a 64 year old male presents for evaluation of his apparent lesion on the lower lip.  He said he bit his lip a while ago.  He noticed the lump.  Since then it has not been bothering him.  He denies any bleeding ulceration.  Patient is not a smoker does not chew tobacco.  He is with his periodontist who was suggested that this should be looked at.      Past Medical History -   Past Medical History:   Diagnosis Date     Cervicalgia 1/14/2007       Current Medications -   Current Outpatient Medications:      ASPIRIN 81 MG OR TABS, ONE DAILY, Disp: 0, Rfl: 0     atorvastatin (LIPITOR) 40 MG tablet, TAKE 1 TABLET BY MOUTH ONCE DAILY, Disp: 90 tablet, Rfl: 2     irbesartan (AVAPRO) 300 MG tablet, TAKE 1 TABLET BY MOUTH ONCE DAILY, Disp: 90 tablet, Rfl: 1     metoprolol succinate ER (TOPROL-XL) 25 MG 24 hr tablet, Take 1 tablet (25 mg) by mouth daily, Disp: 30 tablet, Rfl: 1     triamterene-HCTZ (DYAZIDE) 37.5-25 MG capsule, Take 1 capsule by mouth every morning, Disp: 90 capsule, Rfl: 1    Allergies -   Allergies   Allergen Reactions     No Known Drug Allergies        Social History -   Social History     Socioeconomic History     Marital status:      Spouse name: Nunu     Number of children: 3     Years of education: Not on file     Highest education level: Not on file   Occupational History     Occupation:      Employer: [employer]   Social Needs     Financial resource strain: Not on file     Food insecurity:     Worry: Not on file     Inability: Not on file     Transportation needs:     Medical:  Not on file     Non-medical: Not on file   Tobacco Use     Smoking status: Former Smoker     Packs/day: 0.50     Start date: 12/22/1964     Last attempt to quit: 9/22/2016     Years since quitting: 3.4     Smokeless tobacco: Current User     Tobacco comment: 4 cigs per day   Substance and Sexual Activity     Alcohol use: Yes     Comment: some     Drug use: No     Sexual activity: Yes     Partners: Female   Lifestyle     Physical activity:     Days per week: Not on file     Minutes per session: Not on file     Stress: Not on file   Relationships     Social connections:     Talks on phone: Not on file     Gets together: Not on file     Attends Adventist service: Not on file     Active member of club or organization: Not on file     Attends meetings of clubs or organizations: Not on file     Relationship status: Not on file     Intimate partner violence:     Fear of current or ex partner: Not on file     Emotionally abused: Not on file     Physically abused: Not on file     Forced sexual activity: Not on file   Other Topics Concern      Service Not Asked     Blood Transfusions Not Asked     Caffeine Concern Yes     Comment: one cup of coffee a day     Occupational Exposure Not Asked     Hobby Hazards Not Asked     Sleep Concern Not Asked     Stress Concern Not Asked     Weight Concern Not Asked     Special Diet Not Asked     Back Care Not Asked     Exercise Yes     Comment: 2-3 hours a week: bikes     Bike Helmet Not Asked     Seat Belt Yes     Self-Exams Not Asked     Parent/sibling w/ CABG, MI or angioplasty before 65F 55M? Not Asked   Social History Narrative     Not on file       Family History -   Family History   Problem Relation Age of Onset     Hypertension Mother      Hypertension Father      Diabetes Father         adult, late onset     Lipids Father         ?     Allergies Father      Allergies Paternal Grandfather      Allergies Sister      Allergies Sister        Review of Systems - As per HPI and  PMHx, otherwise review of system review of the head and neck negative. Otherwise 10+ review of system is negative    Physical Exam  There were no vitals taken for this visit.  BMI: There is no height or weight on file to calculate BMI.    General - The patient is well nourished and well developed, and appears to have good nutritional status.  Alert and oriented to person and place, answers questions and cooperates with examination appropriately.    SKIN - No suspicious lesions or rashes.  Respiration - No respiratory distress.  Head and Face - Normocephalic and atraumatic, with no gross asymmetry noted of the contour of the facial features.  The facial nerve is intact, with strong symmetric movements.    Voice and Breathing - The patient was breathing comfortably without the use of accessory muscles. The patients voice was clear and strong, and had appropriate pitch and quality.    Ears - Bilateral pinna and EACs with normal appearing overlying skin. Tympanic membrane intact with good mobility on pneumatic otoscopy bilaterally. Bony landmarks of the ossicular chain are normal. The tympanic membranes are normal in appearance. No retraction, perforation, or masses.  No fluid or purulence was seen in the external canal or the middle ear.     Eyes - Extraocular movements intact.  Sclera were not icteric or injected, conjunctiva were pink and moist.    Mouth - Examination of the oral cavity showed pink, healthy oral mucosa.  Raised left lower inner lip area with some organization below the surface.  No ulceration of the mucosa noted.  The tongue was mobile and midline, and the dentition were in good condition.      Throat - The walls of the oropharynx were smooth, pink, moist, symmetric, and had no lesions or ulcerations.  The tonsillar pillars and soft palate were symmetric.  The uvula was midline on elevation.    Neck - Normal midline excursion of the laryngotracheal complex during swallowing.  Full range of motion on  passive movement.  Palpation of the occipital, submental, submandibular, internal jugular chain, and supraclavicular nodes did not demonstrate any abnormal lymph nodes or masses.  The carotid pulse was palpable bilaterally.  Palpation of the thyroid was soft and smooth, with no nodules or goiter appreciated.  The trachea was mobile and midline.    Nose - External contour is symmetric, no gross deflection or scars.  Nasal mucosa is pink and moist with no abnormal mucus.  The septum was midline and non-obstructive, turbinates of normal size and position.  No polyps, masses, or purulence noted on examination.    Neuro - Nonfocal neuro exam is normal, CN 2 through 12 intact, normal gait and muscle tone.      Performed in clinic today:   excision biopsy left lower lip lesion topical anesthesia was performed with the Cetacaine followed by local anesthesia using 0.3 mL's 1% lidocaine 1-100,000 epinephrine.  After local anesthetic was adequate we used Adson-Brown forceps to engage the lesion and plastic scissors to remove it.  It was completely removed.  Control hemostasis with silver nitrate.  Patient tolerated procedure well.      A/P - Tomy Maldonado is a 64 year old male with lower lip lesion that was removed successfully.  We shall await the results of pathology.  In the meantime he is to avoid spicy foods keep the area clean with saline washes.  He will see me back in 2 weeks in follow-up.      MD Tomy Seinor to follow up with Primary Care provider regarding elevated blood pressure.    142/88        Again, thank you for allowing me to participate in the care of your patient.        Sincerely,        Morgan Brown MD, MD

## 2020-02-26 LAB — COPATH REPORT: NORMAL

## 2020-03-06 NOTE — PROGRESS NOTES
"History of Present Illness - Tomy Maldonado is a 64 year old male presents for evaluation of excised lower lip lesion.  He is quite happy with the outcome with the area healing well without any pain infection or bleeding.  Results of the biopsy is as follows    GROSS:   The specimen is received in formalin with proper patient identification,   labeled \"lip\".  The specimen consists   of a 0.7 x 0.4 x 0.2 cm irregular tan skin shave.  The skin surface is   tan-white, smooth and unremarkable.   The resection margin is inked blue, the specimen is bisected and submitted    in its entirety in cassette A1.   (Dictated by: Neel YEUNG 2/25/2020 04:21 PM)     MICROSCOPIC:   The specimen exhibits epidermal hyperplasia above dermal fibrosis and   ectatic blood vessels.  FINAL DIAGNOSIS:   Skin, lip, excision:   - Fibrous papule -       Past Medical History -   Past Medical History:   Diagnosis Date     Cervicalgia 1/14/2007       Current Medications -   Current Outpatient Medications:      ASPIRIN 81 MG OR TABS, ONE DAILY, Disp: 0, Rfl: 0     atorvastatin (LIPITOR) 40 MG tablet, TAKE 1 TABLET BY MOUTH ONCE DAILY, Disp: 90 tablet, Rfl: 2     irbesartan (AVAPRO) 300 MG tablet, TAKE 1 TABLET BY MOUTH ONCE DAILY, Disp: 90 tablet, Rfl: 1     metoprolol succinate ER (TOPROL-XL) 25 MG 24 hr tablet, Take 1 tablet (25 mg) by mouth daily, Disp: 30 tablet, Rfl: 1     triamterene-HCTZ (DYAZIDE) 37.5-25 MG capsule, Take 1 capsule by mouth every morning (Patient not taking: Reported on 2/24/2020), Disp: 90 capsule, Rfl: 1    Allergies -   Allergies   Allergen Reactions     No Known Drug Allergies        Social History -   Social History     Socioeconomic History     Marital status:      Spouse name: Nunu     Number of children: 3     Years of education: Not on file     Highest education level: Not on file   Occupational History     Occupation:      Employer: [employer]   Social Needs     Financial resource " strain: Not on file     Food insecurity:     Worry: Not on file     Inability: Not on file     Transportation needs:     Medical: Not on file     Non-medical: Not on file   Tobacco Use     Smoking status: Former Smoker     Packs/day: 0.50     Start date: 12/22/1964     Last attempt to quit: 9/22/2016     Years since quitting: 3.4     Smokeless tobacco: Current User     Tobacco comment: 4 cigs per day   Substance and Sexual Activity     Alcohol use: Yes     Comment: some     Drug use: No     Sexual activity: Yes     Partners: Female   Lifestyle     Physical activity:     Days per week: Not on file     Minutes per session: Not on file     Stress: Not on file   Relationships     Social connections:     Talks on phone: Not on file     Gets together: Not on file     Attends Scientologist service: Not on file     Active member of club or organization: Not on file     Attends meetings of clubs or organizations: Not on file     Relationship status: Not on file     Intimate partner violence:     Fear of current or ex partner: Not on file     Emotionally abused: Not on file     Physically abused: Not on file     Forced sexual activity: Not on file   Other Topics Concern      Service Not Asked     Blood Transfusions Not Asked     Caffeine Concern Yes     Comment: one cup of coffee a day     Occupational Exposure Not Asked     Hobby Hazards Not Asked     Sleep Concern Not Asked     Stress Concern Not Asked     Weight Concern Not Asked     Special Diet Not Asked     Back Care Not Asked     Exercise Yes     Comment: 2-3 hours a week: bikes     Bike Helmet Not Asked     Seat Belt Yes     Self-Exams Not Asked     Parent/sibling w/ CABG, MI or angioplasty before 65F 55M? Not Asked   Social History Narrative     Not on file       Family History -   Family History   Problem Relation Age of Onset     Hypertension Mother      Hypertension Father      Diabetes Father         adult, late onset     Lipids Father         ?     Allergies  Father      Allergies Paternal Grandfather      Allergies Sister      Allergies Sister        Review of Systems - As per HPI and PMHx, otherwise review of system review of the head and neck negative. Otherwise 10+ review systems negative.    Physical Exam  There were no vitals taken for this visit.  BMI: There is no height or weight on file to calculate BMI.    General - The patient is well nourished and well developed, and appears to have good nutritional status.  Alert and oriented to person and place, answers questions and cooperates with examination appropriately.    SKIN - No suspicious lesions or rashes.  Respiration - No respiratory distress.  Head and Face - Normocephalic and atraumatic, with no gross asymmetry noted of the contour of the facial features.  The facial nerve is intact, with strong symmetric movements.    Voice and Breathing - The patient was breathing comfortably without the use of accessory muscles. The patients voice was clear and strong, and had appropriate pitch and quality.    Eyes - Extraocular movements intact.  Sclera were not icteric or injected, conjunctiva were pink and moist.    Mouth - Examination of the oral cavity showed pink, healthy oral mucosa. No lesions or ulcerations noted.  The tongue was mobile and midline, and the dentition were in good condition.  Left lower lip area is healing very well was completely healed.    Throat - The walls of the oropharynx were smooth, pink, moist, symmetric, and had no lesions or ulcerations.  The tonsillar pillars and soft palate were symmetric. The uvula was midline on elevation.    Neck - Normal midline excursion of the laryngotracheal complex during swallowing.  Full range of motion on passive movement.  Palpation of the occipital, submental, submandibular, internal jugular chain, and supraclavicular nodes did not demonstrate any abnormal lymph nodes or masses.  The carotid pulse was palpable bilaterally.  Palpation of the thyroid was  soft and smooth, with no nodules or goiter appreciated.  The trachea was mobile and midline.    Nose - External contour is symmetric, no gross deflection or scars.  Nasal mucosa is pink and moist with no abnormal mucus.  The septum was midline and non-obstructive, turbinates of normal size and position.  No polyps, masses, or purulence noted on examination.    Neuro - Nonfocal neuro exam is normal, CN 2 through 12 intact, normal gait and muscle tone.      A/P - Tomy Maldonado is a 64 year old male Patient presents with:  Follow Up: lip lesion    We discussed over the patient today the benign nature of the finding on the lip.  We discussed some ways to potentially stop biting his lip which can cause more of those reactive process.  Good humidification of the oral cavity is important.  He will see me back as needed.        MD Tomy Senior to follow up with Primary Care provider regarding elevated blood pressure.

## 2020-03-09 ENCOUNTER — OFFICE VISIT (OUTPATIENT)
Dept: OTOLARYNGOLOGY | Facility: CLINIC | Age: 65
End: 2020-03-09
Payer: COMMERCIAL

## 2020-03-09 VITALS
BODY MASS INDEX: 29.59 KG/M2 | HEIGHT: 66 IN | SYSTOLIC BLOOD PRESSURE: 136 MMHG | DIASTOLIC BLOOD PRESSURE: 86 MMHG | WEIGHT: 184.1 LBS

## 2020-03-09 DIAGNOSIS — K13.70 LESION OF ORAL MUCOSA: Primary | ICD-10-CM

## 2020-03-09 PROCEDURE — 99213 OFFICE O/P EST LOW 20 MIN: CPT | Performed by: OTOLARYNGOLOGY

## 2020-03-09 ASSESSMENT — MIFFLIN-ST. JEOR: SCORE: 1567.82

## 2020-03-09 NOTE — LETTER
"    3/9/2020         RE: Tomy Maldonado  [address]  [facility] [address]        Dear Colleague,    Thank you for referring your patient, Tomy Maldonado, to the [facility]. Please see a copy of my visit note below.    History of Present Illness - Tomy Maldonado is a 64 year old male presents for evaluation of excised lower lip lesion.  He is quite happy with the outcome with the area healing well without any pain infection or bleeding.  Results of the biopsy is as follows    GROSS:   The specimen is received in formalin with proper patient identification,   labeled \"lip\".  The specimen consists   of a 0.7 x 0.4 x 0.2 cm irregular tan skin shave.  The skin surface is   tan-white, smooth and unremarkable.   The resection margin is inked blue, the specimen is bisected and submitted    in its entirety in cassette A1.   (Dictated by: Neel YEUNG 2/25/2020 04:21 PM)     MICROSCOPIC:   The specimen exhibits epidermal hyperplasia above dermal fibrosis and   ectatic blood vessels.  FINAL DIAGNOSIS:   Skin, lip, excision:   - Fibrous papule -     Patient's other complaints has been raspy voice in general and considering former smoking history even though he denies any dyspnea or hemoptysis this needs to be worked up further.  Second problem is ears feeling plugged bilaterally.  He denies any ear discharge any previous ear infections.  Past Medical History -   Past Medical History:   Diagnosis Date     Cervicalgia 1/14/2007       Current Medications -   Current Outpatient Medications:      ASPIRIN 81 MG OR TABS, ONE DAILY, Disp: 0, Rfl: 0     atorvastatin (LIPITOR) 40 MG tablet, TAKE 1 TABLET BY MOUTH ONCE DAILY, Disp: 90 tablet, Rfl: 2     irbesartan (AVAPRO) 300 MG tablet, TAKE 1 TABLET BY MOUTH ONCE DAILY, Disp: 90 tablet, Rfl: 1     metoprolol succinate ER (TOPROL-XL) 25 MG 24 hr tablet, Take 1 tablet (25 mg) by mouth daily, Disp: 30 tablet, Rfl: 1     triamterene-HCTZ (DYAZIDE) 37.5-25 " MG capsule, Take 1 capsule by mouth every morning (Patient not taking: Reported on 2/24/2020), Disp: 90 capsule, Rfl: 1    Allergies -   Allergies   Allergen Reactions     No Known Drug Allergies        Social History -   Social History     Socioeconomic History     Marital status:      Spouse name: Nunu     Number of children: 3     Years of education: Not on file     Highest education level: Not on file   Occupational History     Occupation:      Employer: [employer]   Social Needs     Financial resource strain: Not on file     Food insecurity:     Worry: Not on file     Inability: Not on file     Transportation needs:     Medical: Not on file     Non-medical: Not on file   Tobacco Use     Smoking status: Former Smoker     Packs/day: 0.50     Start date: 12/22/1964     Last attempt to quit: 9/22/2016     Years since quitting: 3.4     Smokeless tobacco: Current User     Tobacco comment: 4 cigs per day   Substance and Sexual Activity     Alcohol use: Yes     Comment: some     Drug use: No     Sexual activity: Yes     Partners: Female   Lifestyle     Physical activity:     Days per week: Not on file     Minutes per session: Not on file     Stress: Not on file   Relationships     Social connections:     Talks on phone: Not on file     Gets together: Not on file     Attends Spiritism service: Not on file     Active member of club or organization: Not on file     Attends meetings of clubs or organizations: Not on file     Relationship status: Not on file     Intimate partner violence:     Fear of current or ex partner: Not on file     Emotionally abused: Not on file     Physically abused: Not on file     Forced sexual activity: Not on file   Other Topics Concern      Service Not Asked     Blood Transfusions Not Asked     Caffeine Concern Yes     Comment: one cup of coffee a day     Occupational Exposure Not Asked     Hobby Hazards Not Asked     Sleep Concern Not Asked     Stress Concern Not  Asked     Weight Concern Not Asked     Special Diet Not Asked     Back Care Not Asked     Exercise Yes     Comment: 2-3 hours a week: bikes     Bike Helmet Not Asked     Seat Belt Yes     Self-Exams Not Asked     Parent/sibling w/ CABG, MI or angioplasty before 65F 55M? Not Asked   Social History Narrative     Not on file       Family History -   Family History   Problem Relation Age of Onset     Hypertension Mother      Hypertension Father      Diabetes Father         adult, late onset     Lipids Father         ?     Allergies Father      Allergies Paternal Grandfather      Allergies Sister      Allergies Sister        Review of Systems - As per HPI and PMHx, otherwise review of system review of the head and neck negative. Otherwise 10+ review systems negative.    Physical Exam  There were no vitals taken for this visit.  BMI: There is no height or weight on file to calculate BMI.    General - The patient is well nourished and well developed, and appears to have good nutritional status.  Alert and oriented to person and place, answers questions and cooperates with examination appropriately.    SKIN - No suspicious lesions or rashes.  Respiration - No respiratory distress.  Head and Face - Normocephalic and atraumatic, with no gross asymmetry noted of the contour of the facial features.  The facial nerve is intact, with strong symmetric movements.    Voice and Breathing - The patient was breathing comfortably without the use of accessory muscles. The patients voice was clear and strong, and had appropriate pitch and quality.    Eyes - Extraocular movements intact.  Sclera were not icteric or injected, conjunctiva were pink and moist.    Mouth - Examination of the oral cavity showed pink, healthy oral mucosa. No lesions or ulcerations noted.  The tongue was mobile and midline, and the dentition were in good condition.  Left lower lip area is healing very well was completely healed.    Throat - The walls of the  oropharynx were smooth, pink, moist, symmetric, and had no lesions or ulcerations.  The tonsillar pillars and soft palate were symmetric. The uvula was midline on elevation.    Neck - Normal midline excursion of the laryngotracheal complex during swallowing.  Full range of motion on passive movement.  Palpation of the occipital, submental, submandibular, internal jugular chain, and supraclavicular nodes did not demonstrate any abnormal lymph nodes or masses.  The carotid pulse was palpable bilaterally.  Palpation of the thyroid was soft and smooth, with no nodules or goiter appreciated.  The trachea was mobile and midline.    Nose - External contour is symmetric, no gross deflection or scars.  Nasal mucosa is pink and moist with no abnormal mucus.  The septum was midline and non-obstructive, turbinates of normal size and position.  No polyps, masses, or purulence noted on examination.    Neuro - Nonfocal neuro exam is normal, CN 2 through 12 intact, normal gait and muscle tone.      Procedure performed today was fiberoptic laryngoscopy:  After topical anesthetic with Xylocaine decongestion with [place]-Synephrine fiberoptic scope was placed transnasally.  Nasopharyngeal exam shows soft tissues eustachian tube orifice sphenoethmoid recess bilaterally clear no masses lesions appreciated.  Supraglottic structures vallecula epiglottis aryepiglottic folds were clear.  True cord exam shows the significant paresis of the vocal cord.  Vibratory edge appears to be clear no other pathology is noted.  Piriform sinuses were clear bilaterally.  Patient tolerates procedure well.  Fiberoptic scope use was optim yellow scope.    Procedure performed cerumen disimpaction:  Patient was found to have significant cerumen impaction bilaterally.  Using cerumen curette on the right alligator forceps carefully remove the cerumen without any issues.  Tympanic membrane is clear canals clear and dry.  On the opposite side the use of a cerumen  curette mostly to remove the cerumen under the guidance of magnified speculum.  Patient tolerates it well.  Tympanic membrane appreciated clear.    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  In regard to vocal paresis would like to work it up completely with a CT of the neck and chest to make sure that the considering his prior smoking history there is no other reason to think that the there is nerve compression.  After proper work-up full-court paresis and to follow with    We discussed over the patient today the benign nature of the finding on the lip.  We discussed some ways to potentially stop biting his lip which can cause more of those reactive process.  Good humidification of the oral cavity is important.  He will see me back after complete work-up for the vocal cord paresis and to follow-up with following carefully..          MD Tomy Senior to follow up with Primary Care provider regarding elevated blood pressure.        Again, thank you for allowing me to participate in the care of your patient.        Sincerely,        Morgan Brown MD, MD

## 2020-04-02 ENCOUNTER — MYC REFILL (OUTPATIENT)
Dept: FAMILY MEDICINE | Facility: OTHER | Age: 65
End: 2020-04-02

## 2020-04-02 DIAGNOSIS — I10 HYPERTENSION GOAL BP (BLOOD PRESSURE) < 140/90: ICD-10-CM

## 2020-04-02 RX ORDER — METOPROLOL SUCCINATE 25 MG/1
25 TABLET, EXTENDED RELEASE ORAL DAILY
Qty: 90 TABLET | Refills: 1 | Status: SHIPPED | OUTPATIENT
Start: 2020-04-02 | End: 2020-10-07

## 2020-06-11 ENCOUNTER — MYC REFILL (OUTPATIENT)
Dept: FAMILY MEDICINE | Facility: OTHER | Age: 65
End: 2020-06-11

## 2020-06-11 DIAGNOSIS — I10 HYPERTENSION GOAL BP (BLOOD PRESSURE) < 140/90: ICD-10-CM

## 2020-06-11 RX ORDER — IRBESARTAN 300 MG/1
300 TABLET ORAL DAILY
Qty: 90 TABLET | Refills: 1 | Status: CANCELLED | OUTPATIENT
Start: 2020-06-11

## 2020-06-11 NOTE — TELEPHONE ENCOUNTER
Pending Prescriptions:                       Disp   Refills    irbesartan (AVAPRO) 300 MG tablet [Pharmac*90 tab*0        Sig: Take 1 tablet by mouth once daily      Routing refill request to provider for review/approval because:  Drug interaction warning    Haleigh Abraham, MSN, RN

## 2020-06-12 DIAGNOSIS — I10 HYPERTENSION GOAL BP (BLOOD PRESSURE) < 140/90: ICD-10-CM

## 2020-06-12 RX ORDER — IRBESARTAN 300 MG/1
TABLET ORAL
Qty: 90 TABLET | Refills: 0 | OUTPATIENT
Start: 2020-06-12

## 2020-06-12 RX ORDER — IRBESARTAN 300 MG/1
TABLET ORAL
Qty: 30 TABLET | Refills: 0 | Status: SHIPPED | OUTPATIENT
Start: 2020-06-12 | End: 2020-07-14

## 2020-06-12 NOTE — TELEPHONE ENCOUNTER
Pending Prescriptions:                       Disp   Refills    irbesartan (AVAPRO) 300 MG tablet [Pharma*90 tab*0            Sig: Take 1 tablet by mouth once daily    Erlinda Abraham, MSN, RN

## 2020-06-13 DIAGNOSIS — I10 HYPERTENSION GOAL BP (BLOOD PRESSURE) < 140/90: ICD-10-CM

## 2020-06-15 RX ORDER — IRBESARTAN 300 MG/1
TABLET ORAL
Qty: 90 TABLET | Refills: 0 | OUTPATIENT
Start: 2020-06-15

## 2020-07-13 ENCOUNTER — MYC REFILL (OUTPATIENT)
Dept: FAMILY MEDICINE | Facility: OTHER | Age: 65
End: 2020-07-13

## 2020-07-13 DIAGNOSIS — I10 HYPERTENSION GOAL BP (BLOOD PRESSURE) < 140/90: ICD-10-CM

## 2020-07-13 RX ORDER — IRBESARTAN 300 MG/1
300 TABLET ORAL DAILY
Qty: 30 TABLET | Refills: 0 | Status: CANCELLED | OUTPATIENT
Start: 2020-07-13

## 2020-07-14 RX ORDER — IRBESARTAN 300 MG/1
TABLET ORAL
Qty: 90 TABLET | Refills: 0 | Status: SHIPPED | OUTPATIENT
Start: 2020-07-14 | End: 2020-10-10

## 2020-07-14 NOTE — TELEPHONE ENCOUNTER
Prescription approved per [facility] Refill Protocol.    Macrina Boo, RN, BSN

## 2020-08-27 DIAGNOSIS — E78.5 HYPERLIPIDEMIA, UNSPECIFIED HYPERLIPIDEMIA TYPE: ICD-10-CM

## 2020-08-27 RX ORDER — ATORVASTATIN CALCIUM 40 MG/1
TABLET, FILM COATED ORAL
Qty: 90 TABLET | Refills: 0 | Status: SHIPPED | OUTPATIENT
Start: 2020-08-27 | End: 2020-11-25

## 2020-10-07 ENCOUNTER — MYC REFILL (OUTPATIENT)
Dept: FAMILY MEDICINE | Facility: OTHER | Age: 65
End: 2020-10-07

## 2020-10-07 DIAGNOSIS — I10 HYPERTENSION GOAL BP (BLOOD PRESSURE) < 140/90: ICD-10-CM

## 2020-10-08 ENCOUNTER — MYC REFILL (OUTPATIENT)
Dept: FAMILY MEDICINE | Facility: OTHER | Age: 65
End: 2020-10-08

## 2020-10-08 DIAGNOSIS — I10 HYPERTENSION GOAL BP (BLOOD PRESSURE) < 140/90: ICD-10-CM

## 2020-10-08 RX ORDER — METOPROLOL SUCCINATE 25 MG/1
25 TABLET, EXTENDED RELEASE ORAL DAILY
Qty: 90 TABLET | Refills: 1 | Status: SHIPPED | OUTPATIENT
Start: 2020-10-08 | End: 2021-01-14

## 2020-10-09 DIAGNOSIS — I10 HYPERTENSION GOAL BP (BLOOD PRESSURE) < 140/90: ICD-10-CM

## 2020-10-09 RX ORDER — METOPROLOL SUCCINATE 25 MG/1
25 TABLET, EXTENDED RELEASE ORAL DAILY
Qty: 90 TABLET | Refills: 1 | OUTPATIENT
Start: 2020-10-09

## 2020-10-09 RX ORDER — METOPROLOL SUCCINATE 25 MG/1
TABLET, EXTENDED RELEASE ORAL
Qty: 90 TABLET | Refills: 0 | OUTPATIENT
Start: 2020-10-09

## 2020-10-09 NOTE — LETTER
[facility]  [address]  [place] [address] [phone]          October 26, 2020    Tomy Maldonado                                                                                                                     [id number] [facility] [address]            Dear Tomy,    We have tried to contact you by phone but have been unable to connect with you. We wanted to let you know that we were able to refill your medication but need to see you for a virtual visit before your next refill if due    If you have any questions please call us at [phone]    Sincerely,     Your [place] Care Team

## 2020-10-10 RX ORDER — IRBESARTAN 300 MG/1
TABLET ORAL
Qty: 90 TABLET | Refills: 0 | Status: SHIPPED | OUTPATIENT
Start: 2020-10-10 | End: 2021-01-12

## 2020-11-25 ENCOUNTER — MYC REFILL (OUTPATIENT)
Dept: FAMILY MEDICINE | Facility: OTHER | Age: 65
End: 2020-11-25

## 2020-11-25 DIAGNOSIS — E78.5 HYPERLIPIDEMIA, UNSPECIFIED HYPERLIPIDEMIA TYPE: ICD-10-CM

## 2020-11-25 RX ORDER — ATORVASTATIN CALCIUM 40 MG/1
40 TABLET, FILM COATED ORAL DAILY
Qty: 90 TABLET | Refills: 0 | Status: SHIPPED | OUTPATIENT
Start: 2020-11-25 | End: 2021-01-14

## 2020-11-25 RX ORDER — ATORVASTATIN CALCIUM 40 MG/1
TABLET, FILM COATED ORAL
Qty: 90 TABLET | Refills: 0 | OUTPATIENT
Start: 2020-11-25

## 2020-11-25 NOTE — TELEPHONE ENCOUNTER
Pending Prescriptions:                       Disp   Refills    atorvastatin (LIPITOR) 40 MG tablet       90 tab*0            Sig: Take 1 tablet (40 mg) by mouth daily    Medication is being filled for 1 time raul refill only due to:  Patient is due for medication check/labs.     Please call and help schedule.  Thank you!  Jolynn Olea RN  November 25, 2020

## 2021-01-09 ENCOUNTER — HEALTH MAINTENANCE LETTER (OUTPATIENT)
Age: 66
End: 2021-01-09

## 2021-01-10 DIAGNOSIS — I10 HYPERTENSION GOAL BP (BLOOD PRESSURE) < 140/90: ICD-10-CM

## 2021-01-11 RX ORDER — IRBESARTAN 300 MG/1
TABLET ORAL
Qty: 90 TABLET | Refills: 0 | OUTPATIENT
Start: 2021-01-11

## 2021-01-11 NOTE — TELEPHONE ENCOUNTER
Pending Prescriptions:                       Disp   Refills    irbesartan (AVAPRO) 300 MG tablet [Pharmac*90 tab*0        Sig: Take 1 tablet by mouth once daily    Routing refill request to provider for review/approval because:  Labs not current:    Creatinine   Date Value Ref Range Status   08/29/2019 1.12 0.66 - 1.25 mg/dL Final     Potassium   Date Value Ref Range Status   08/29/2019 4.6 3.4 - 5.3 mmol/L Final      over 13 months, RN unable to provide a raul refill.

## 2021-01-12 RX ORDER — IRBESARTAN 300 MG/1
300 TABLET ORAL DAILY
Qty: 30 TABLET | Refills: 0 | Status: SHIPPED | OUTPATIENT
Start: 2021-01-12 | End: 2021-01-14

## 2021-01-12 NOTE — PROGRESS NOTES
"  Assessment & Plan     Screening for HIV (human immunodeficiency virus)  Declined today    Screen for colon cancer  Orders placed    Hypertension goal BP (blood pressure) < 140/90  Fairly good control today.  No new concerns.  Follow-up in 6 months.  - HIV Antigen Antibody Combo  - Lipid panel reflex to direct LDL Fasting  - GASTROENTEROLOGY ADULT REF PROCEDURE ONLY; Future  - triamterene-HCTZ (DYAZIDE) 37.5-25 MG capsule; Take 1 capsule by mouth every morning  - metoprolol succinate ER (TOPROL-XL) 25 MG 24 hr tablet; Take 1 tablet (25 mg) by mouth daily  - irbesartan (AVAPRO) 300 MG tablet; Take 1 tablet (300 mg) by mouth daily  - CBC with platelets  - Comprehensive metabolic panel    Hyperlipidemia LDL goal <130  Labs pending.                       BMI:   Estimated body mass index is 32.12 kg/m  as calculated from the following:    Height as of this encounter: 1.676 m (5' 6\").    Weight as of this encounter: 90.3 kg (199 lb).   Weight management plan: Discussed healthy diet and exercise guidelines      Work on weight loss  Regular exercise  Return in about 6 months (around 7/14/2021) for Medication Recheck, BP Recheck with provider.    Demetris Duke PA-C  [facility] ARABELLA Hyde is a 65 year old who presents to clinic today for the following health issues     HPI       Hyperlipidemia Follow-Up      Are you regularly taking any medication or supplement to lower your cholesterol?   Yes- Lipitor    Are you having muscle aches or other side effects that you think could be caused by your cholesterol lowering medication?  Yes- has noticed some joint aches    Hypertension Follow-up      Do you check your blood pressure regularly outside of the clinic? Yes     Are you following a low salt diet? Yes    Are your blood pressures ever more than 140 on the top number (systolic) OR more   than 90 on the bottom number (diastolic), for example 140/90? Yes      Review of Systems   Constitutional, " "HEENT, cardiovascular, pulmonary, GI, , musculoskeletal, neuro, skin, endocrine and psych systems are negative, except as otherwise noted.      Objective    /86   Pulse 92   Temp 97.2  F (36.2  C) (Temporal)   Ht 1.676 m (5' 6\")   Wt 90.3 kg (199 lb)   SpO2 98%   BMI 32.12 kg/m    Body mass index is 32.12 kg/m .  Physical Exam   GENERAL: healthy, alert and no distress  NECK: no adenopathy, no asymmetry, masses, or scars and thyroid normal to palpation  RESP: lungs clear to auscultation - no rales, rhonchi or wheezes  CV: regular rate and rhythm, normal S1 S2, no S3 or S4, no murmur, click or rub, no peripheral edema and peripheral pulses strong  ABDOMEN: soft, nontender, no hepatosplenomegaly, no masses and bowel sounds normal  MS: no gross musculoskeletal defects noted, no edema    Results for orders placed or performed in visit on 01/14/21 (from the past 24 hour(s))   CBC with platelets   Result Value Ref Range    WBC 8.1 4.0 - 11.0 10e9/L    RBC Count 3.88 (L) 4.4 - 5.9 10e12/L    Hemoglobin 13.1 (L) 13.3 - 17.7 g/dL    Hematocrit 38.9 (L) 40.0 - 53.0 %     78 - 100 fl    MCH 33.8 (H) 26.5 - 33.0 pg    MCHC 33.7 31.5 - 36.5 g/dL    RDW 12.7 10.0 - 15.0 %    Platelet Count 223 150 - 450 10e9/L             "

## 2021-01-12 NOTE — TELEPHONE ENCOUNTER
Pt was scheduled for phone visit, provider requires f2f as its been over a year. Appt moved to 1/14/2021. Pt is out of meds, hoping for small refill to get him to Thursday

## 2021-01-14 ENCOUNTER — OFFICE VISIT (OUTPATIENT)
Dept: FAMILY MEDICINE | Facility: OTHER | Age: 66
End: 2021-01-14
Payer: COMMERCIAL

## 2021-01-14 VITALS
HEIGHT: 66 IN | HEART RATE: 92 BPM | TEMPERATURE: 97.2 F | SYSTOLIC BLOOD PRESSURE: 130 MMHG | OXYGEN SATURATION: 98 % | BODY MASS INDEX: 31.98 KG/M2 | WEIGHT: 199 LBS | DIASTOLIC BLOOD PRESSURE: 86 MMHG

## 2021-01-14 DIAGNOSIS — Z11.4 SCREENING FOR HIV (HUMAN IMMUNODEFICIENCY VIRUS): ICD-10-CM

## 2021-01-14 DIAGNOSIS — Z12.11 SCREEN FOR COLON CANCER: ICD-10-CM

## 2021-01-14 DIAGNOSIS — I10 HYPERTENSION GOAL BP (BLOOD PRESSURE) < 140/90: ICD-10-CM

## 2021-01-14 DIAGNOSIS — E78.5 HYPERLIPIDEMIA LDL GOAL <130: Primary | ICD-10-CM

## 2021-01-14 LAB
ALBUMIN SERPL-MCNC: 4 G/DL (ref 3.4–5)
ALP SERPL-CCNC: 78 U/L (ref 40–150)
ALT SERPL W P-5'-P-CCNC: 75 U/L (ref 0–70)
ANION GAP SERPL CALCULATED.3IONS-SCNC: 6 MMOL/L (ref 3–14)
AST SERPL W P-5'-P-CCNC: 38 U/L (ref 0–45)
BILIRUB SERPL-MCNC: 0.5 MG/DL (ref 0.2–1.3)
BUN SERPL-MCNC: 20 MG/DL (ref 7–30)
CALCIUM SERPL-MCNC: 9.6 MG/DL (ref 8.5–10.1)
CHLORIDE SERPL-SCNC: 111 MMOL/L (ref 94–109)
CHOLEST SERPL-MCNC: 185 MG/DL
CO2 SERPL-SCNC: 23 MMOL/L (ref 20–32)
CREAT SERPL-MCNC: 1.51 MG/DL (ref 0.66–1.25)
ERYTHROCYTE [DISTWIDTH] IN BLOOD BY AUTOMATED COUNT: 12.7 % (ref 10–15)
GFR SERPL CREATININE-BSD FRML MDRD: 48 ML/MIN/{1.73_M2}
GLUCOSE SERPL-MCNC: 104 MG/DL (ref 70–99)
HCT VFR BLD AUTO: 38.9 % (ref 40–53)
HDLC SERPL-MCNC: 80 MG/DL
HGB BLD-MCNC: 13.1 G/DL (ref 13.3–17.7)
HIV 1+2 AB+HIV1 P24 AG SERPL QL IA: NONREACTIVE
LDLC SERPL CALC-MCNC: 68 MG/DL
MCH RBC QN AUTO: 33.8 PG (ref 26.5–33)
MCHC RBC AUTO-ENTMCNC: 33.7 G/DL (ref 31.5–36.5)
MCV RBC AUTO: 100 FL (ref 78–100)
NONHDLC SERPL-MCNC: 105 MG/DL
PLATELET # BLD AUTO: 223 10E9/L (ref 150–450)
POTASSIUM SERPL-SCNC: 4.6 MMOL/L (ref 3.4–5.3)
PROT SERPL-MCNC: 7.7 G/DL (ref 6.8–8.8)
RBC # BLD AUTO: 3.88 10E12/L (ref 4.4–5.9)
SODIUM SERPL-SCNC: 140 MMOL/L (ref 133–144)
TRIGL SERPL-MCNC: 186 MG/DL
WBC # BLD AUTO: 8.1 10E9/L (ref 4–11)

## 2021-01-14 PROCEDURE — 80061 LIPID PANEL: CPT | Performed by: PHYSICIAN ASSISTANT

## 2021-01-14 PROCEDURE — 99214 OFFICE O/P EST MOD 30 MIN: CPT | Performed by: PHYSICIAN ASSISTANT

## 2021-01-14 PROCEDURE — 80053 COMPREHEN METABOLIC PANEL: CPT | Performed by: PHYSICIAN ASSISTANT

## 2021-01-14 PROCEDURE — 87389 HIV-1 AG W/HIV-1&-2 AB AG IA: CPT | Performed by: PHYSICIAN ASSISTANT

## 2021-01-14 PROCEDURE — 85027 COMPLETE CBC AUTOMATED: CPT | Performed by: PHYSICIAN ASSISTANT

## 2021-01-14 PROCEDURE — 36415 COLL VENOUS BLD VENIPUNCTURE: CPT | Performed by: PHYSICIAN ASSISTANT

## 2021-01-14 RX ORDER — METOPROLOL SUCCINATE 25 MG/1
25 TABLET, EXTENDED RELEASE ORAL DAILY
Qty: 90 TABLET | Refills: 1 | Status: SHIPPED | OUTPATIENT
Start: 2021-01-14 | End: 2021-04-06

## 2021-01-14 RX ORDER — IRBESARTAN 300 MG/1
300 TABLET ORAL DAILY
Qty: 30 TABLET | Refills: 0 | Status: SHIPPED | OUTPATIENT
Start: 2021-01-14 | End: 2021-03-11

## 2021-01-14 RX ORDER — ATORVASTATIN CALCIUM 40 MG/1
40 TABLET, FILM COATED ORAL DAILY
Qty: 90 TABLET | Refills: 0 | Status: SHIPPED | OUTPATIENT
Start: 2021-01-14 | End: 2021-05-25

## 2021-01-14 RX ORDER — TRIAMTERENE AND HYDROCHLOROTHIAZIDE 37.5; 25 MG/1; MG/1
1 CAPSULE ORAL EVERY MORNING
Qty: 90 CAPSULE | Refills: 1 | Status: SHIPPED | OUTPATIENT
Start: 2021-01-14 | End: 2021-02-25

## 2021-01-14 ASSESSMENT — MIFFLIN-ST. JEOR: SCORE: 1630.41

## 2021-01-15 ENCOUNTER — TELEPHONE (OUTPATIENT)
Dept: FAMILY MEDICINE | Facility: OTHER | Age: 66
End: 2021-01-15

## 2021-01-15 NOTE — TELEPHONE ENCOUNTER
Left message for patient to return call to schedule EGD/colonoscopy. If Leighann or [facility] are not available, please transfer to same day surgery

## 2021-01-18 NOTE — TELEPHONE ENCOUNTER
Spoke to wife, she states he does not want colonoscopy until he gets covid vaccine. He will call when ready to schedule

## 2021-02-09 ENCOUNTER — HOSPITAL ENCOUNTER (EMERGENCY)
Facility: CLINIC | Age: 66
Discharge: HOME | End: 2021-02-09
Attending: FAMILY MEDICINE | Admitting: FAMILY MEDICINE
Payer: COMMERCIAL

## 2021-02-09 ENCOUNTER — NURSE TRIAGE (OUTPATIENT)
Dept: FAMILY MEDICINE | Facility: OTHER | Age: 66
End: 2021-02-09

## 2021-02-09 VITALS
DIASTOLIC BLOOD PRESSURE: 80 MMHG | RESPIRATION RATE: 24 BRPM | HEIGHT: 66 IN | SYSTOLIC BLOOD PRESSURE: 125 MMHG | BODY MASS INDEX: 31.66 KG/M2 | WEIGHT: 197 LBS | HEART RATE: 67 BPM | OXYGEN SATURATION: 98 % | TEMPERATURE: 97.4 F

## 2021-02-09 DIAGNOSIS — E78.5 HYPERLIPIDEMIA LDL GOAL <130: ICD-10-CM

## 2021-02-09 DIAGNOSIS — F10.11 HISTORY OF ALCOHOL ABUSE: ICD-10-CM

## 2021-02-09 DIAGNOSIS — I10 ESSENTIAL HYPERTENSION: ICD-10-CM

## 2021-02-09 DIAGNOSIS — R71.8 ELEVATED MCV: ICD-10-CM

## 2021-02-09 DIAGNOSIS — Z87.891 HISTORY OF TOBACCO ABUSE: ICD-10-CM

## 2021-02-09 DIAGNOSIS — R55 NEAR SYNCOPE: ICD-10-CM

## 2021-02-09 DIAGNOSIS — R03.1 LOW BLOOD PRESSURE READING: ICD-10-CM

## 2021-02-09 LAB
ALBUMIN SERPL-MCNC: 3.6 G/DL (ref 3.4–5)
ALBUMIN UR-MCNC: NEGATIVE MG/DL
ALP SERPL-CCNC: 92 U/L (ref 40–150)
ALT SERPL W P-5'-P-CCNC: 102 U/L (ref 0–70)
ANION GAP SERPL CALCULATED.3IONS-SCNC: 3 MMOL/L (ref 3–14)
APPEARANCE UR: CLEAR
AST SERPL W P-5'-P-CCNC: 41 U/L (ref 0–45)
BASOPHILS # BLD AUTO: 0.1 10E9/L (ref 0–0.2)
BASOPHILS NFR BLD AUTO: 0.6 %
BILIRUB SERPL-MCNC: 0.4 MG/DL (ref 0.2–1.3)
BILIRUB UR QL STRIP: NEGATIVE
BUN SERPL-MCNC: 22 MG/DL (ref 7–30)
CALCIUM SERPL-MCNC: 8.8 MG/DL (ref 8.5–10.1)
CHLORIDE SERPL-SCNC: 108 MMOL/L (ref 94–109)
CO2 SERPL-SCNC: 29 MMOL/L (ref 20–32)
COLOR UR AUTO: YELLOW
CREAT SERPL-MCNC: 1.58 MG/DL (ref 0.66–1.25)
CRP SERPL-MCNC: 4.5 MG/L (ref 0–8)
DIFFERENTIAL METHOD BLD: ABNORMAL
EOSINOPHIL NFR BLD AUTO: 2.2 %
ERYTHROCYTE [DISTWIDTH] IN BLOOD BY AUTOMATED COUNT: 11.8 % (ref 10–15)
ERYTHROCYTE [SEDIMENTATION RATE] IN BLOOD BY WESTERGREN METHOD: 28 MM/H (ref 0–20)
GFR SERPL CREATININE-BSD FRML MDRD: 45 ML/MIN/{1.73_M2}
GLUCOSE SERPL-MCNC: 103 MG/DL (ref 70–99)
GLUCOSE UR STRIP-MCNC: NEGATIVE MG/DL
HCT VFR BLD AUTO: 37.1 % (ref 40–53)
HGB BLD-MCNC: 12.1 G/DL (ref 13.3–17.7)
HGB UR QL STRIP: NEGATIVE
HYALINE CASTS #/AREA URNS LPF: 5 /LPF (ref 0–2)
IMM GRANULOCYTES # BLD: 0 10E9/L (ref 0–0.4)
IMM GRANULOCYTES NFR BLD: 0.5 %
KETONES UR STRIP-MCNC: NEGATIVE MG/DL
LACTATE BLD-SCNC: 1.2 MMOL/L (ref 0.7–2)
LEUKOCYTE ESTERASE UR QL STRIP: NEGATIVE
LYMPHOCYTES # BLD AUTO: 2.7 10E9/L (ref 0.8–5.3)
LYMPHOCYTES NFR BLD AUTO: 31 %
MCH RBC QN AUTO: 33.4 PG (ref 26.5–33)
MCHC RBC AUTO-ENTMCNC: 32.6 G/DL (ref 31.5–36.5)
MCV RBC AUTO: 103 FL (ref 78–100)
MONOCYTES # BLD AUTO: 0.8 10E9/L (ref 0–1.3)
MONOCYTES NFR BLD AUTO: 9.4 %
MUCOUS THREADS #/AREA URNS LPF: PRESENT /LPF
NEUTROPHILS # BLD AUTO: 4.9 10E9/L (ref 1.6–8.3)
NEUTROPHILS NFR BLD AUTO: 56.3 %
NITRATE UR QL: NEGATIVE
NRBC # BLD AUTO: 0 10*3/UL
NRBC BLD AUTO-RTO: 0 /100
PH UR STRIP: 5 PH (ref 5–7)
PLATELET # BLD AUTO: 283 10E9/L (ref 150–450)
POTASSIUM SERPL-SCNC: 4.4 MMOL/L (ref 3.4–5.3)
PROT SERPL-MCNC: 7.1 G/DL (ref 6.8–8.8)
RBC # BLD AUTO: 3.62 10E12/L (ref 4.4–5.9)
RBC #/AREA URNS AUTO: 1 /HPF (ref 0–2)
SODIUM SERPL-SCNC: 140 MMOL/L (ref 133–144)
SOURCE: ABNORMAL
SP GR UR STRIP: 1.01 (ref 1–1.03)
SQUAMOUS #/AREA URNS AUTO: 1 /HPF (ref 0–1)
TROPONIN I SERPL-MCNC: <0.015 UG/L (ref 0–0.04)
TSH SERPL DL<=0.005 MIU/L-ACNC: 3.04 MU/L (ref 0.4–4)
UROBILINOGEN UR STRIP-MCNC: 0 MG/DL (ref 0–2)
VIT B12 SERPL-MCNC: 599 PG/ML (ref 193–986)
WBC # BLD AUTO: 8.7 10E9/L (ref 4–11)
WBC #/AREA URNS AUTO: 1 /HPF (ref 0–5)

## 2021-02-09 PROCEDURE — 85025 COMPLETE CBC W/AUTO DIFF WBC: CPT | Performed by: FAMILY MEDICINE

## 2021-02-09 PROCEDURE — 85652 RBC SED RATE AUTOMATED: CPT | Performed by: FAMILY MEDICINE

## 2021-02-09 PROCEDURE — 258N000003 HC RX IP 258 OP 636: Performed by: FAMILY MEDICINE

## 2021-02-09 PROCEDURE — 84443 ASSAY THYROID STIM HORMONE: CPT | Performed by: FAMILY MEDICINE

## 2021-02-09 PROCEDURE — 93005 ELECTROCARDIOGRAM TRACING: CPT | Performed by: FAMILY MEDICINE

## 2021-02-09 PROCEDURE — 86140 C-REACTIVE PROTEIN: CPT | Performed by: FAMILY MEDICINE

## 2021-02-09 PROCEDURE — 83605 ASSAY OF LACTIC ACID: CPT | Performed by: FAMILY MEDICINE

## 2021-02-09 PROCEDURE — 82746 ASSAY OF FOLIC ACID SERUM: CPT | Performed by: FAMILY MEDICINE

## 2021-02-09 PROCEDURE — 81001 URINALYSIS AUTO W/SCOPE: CPT | Performed by: FAMILY MEDICINE

## 2021-02-09 PROCEDURE — 82607 VITAMIN B-12: CPT | Performed by: FAMILY MEDICINE

## 2021-02-09 PROCEDURE — 80053 COMPREHEN METABOLIC PANEL: CPT | Performed by: FAMILY MEDICINE

## 2021-02-09 PROCEDURE — 84484 ASSAY OF TROPONIN QUANT: CPT | Performed by: FAMILY MEDICINE

## 2021-02-09 PROCEDURE — 99284 EMERGENCY DEPT VISIT MOD MDM: CPT | Performed by: FAMILY MEDICINE

## 2021-02-09 PROCEDURE — 96360 HYDRATION IV INFUSION INIT: CPT | Performed by: FAMILY MEDICINE

## 2021-02-09 PROCEDURE — 99284 EMERGENCY DEPT VISIT MOD MDM: CPT | Mod: 25 | Performed by: FAMILY MEDICINE

## 2021-02-09 RX ORDER — LANOLIN ALCOHOL/MO/W.PET/CERES
1 CREAM (GRAM) TOPICAL
COMMUNITY
End: 2022-03-10

## 2021-02-09 RX ADMIN — SODIUM CHLORIDE 1000 ML: 9 INJECTION, SOLUTION INTRAVENOUS at 16:46

## 2021-02-09 ASSESSMENT — ENCOUNTER SYMPTOMS
VOMITING: 0
SINUS PAIN: 0
POLYPHAGIA: 0
ABDOMINAL PAIN: 0
DIZZINESS: 1
DIARRHEA: 0
ARTHRALGIAS: 1
FREQUENCY: 0
HEADACHES: 0
LIGHT-HEADEDNESS: 1
CHILLS: 0
NERVOUS/ANXIOUS: 0
COUGH: 0
EYE PAIN: 0
POLYDIPSIA: 0
NAUSEA: 0
CHEST TIGHTNESS: 0
SHORTNESS OF BREATH: 0
FEVER: 0
SORE THROAT: 0
DYSURIA: 0
WEAKNESS: 1
PALPITATIONS: 0
BACK PAIN: 0
TROUBLE SWALLOWING: 0
FATIGUE: 1
EYE REDNESS: 0
CONFUSION: 0

## 2021-02-09 ASSESSMENT — MIFFLIN-ST. JEOR: SCORE: 1621.34

## 2021-02-09 NOTE — TELEPHONE ENCOUNTER
"  Reason for Disposition    Systolic BP < 80 and NOT dizzy, lightheaded or weak (feels normal)    Additional Information    Negative: Systolic BP < 90 and feeling dizzy, lightheaded, or weak    Negative: Started suddenly after an allergic medicine, an allergic food, or bee sting    Negative: Shock suspected (e.g., cold/pale/clammy skin, too weak to stand, low BP, rapid pulse)    Negative: Difficult to awaken or acting confused  (e.g., disoriented, slurred speech)    Negative: Fainted    Negative: Chest pain    Negative: Bleeding (e.g., vomiting blood, rectal bleeding or tarry stools, severe vaginal bleeding)    Negative: Extra heart beats or heart is beating fast  (i.e., \"palpitations\")    Negative: Sounds like a life-threatening emergency to the triager    Answer Assessment - Initial Assessment Questions  1. BLOOD PRESSURE: \"What is the blood pressure?\" \"Did you take at least two measurements 5 minutes apart?\"      82/50 today, 92/54 earlier today  2. ONSET: \"When did you take your blood pressure?\"      2:50pm today  3. HOW: \"How did you obtain the blood pressure?\" (e.g., visiting nurse, automatic home BP monitor)      Home BP monitor  4. HISTORY: \"Do you have a history of low blood pressure?\" \"What is your blood pressure normally?\"      150/90  5. MEDICATIONS: \"Are you taking any medications for blood pressure?\" If yes: \"Have they been changed recently?\"      Yes, has not missed any doses, no changes  6. PULSE RATE: \"Do you know what your pulse rate is?\"       67  7. OTHER SYMPTOMS: \"Have you been sick recently?\" \"Have you had a recent injury?\"      Feels dizzy if gets up out of chair and vision is fuzzy  8. PREGNANCY: \"Is there any chance you are pregnant?\" \"When was your last menstrual period?\"      N/A    Protocols used: LOW BLOOD PRESSURE-A-OH    "

## 2021-02-09 NOTE — ED NOTES
Pt up to side of bed for orthostatic bps. Pt has no complaints with position changes. Pt then to BR without any concerns. Water given to pt.

## 2021-02-09 NOTE — ED PROVIDER NOTES
"  History     Chief Complaint   Patient presents with     Hypotension     HPI  Tomy Maldonado is a 65 year old male with history of hypertension, hyperlipidemia, history of tobacco abuse quitting 2016 and history of alcohol abuse quitting 3 weeks ago who presents to the emergency department with low blood pressures.  The patient was feeling lightheaded dizzy and weak \"like he was going to pass out\" at home.  Wife stated he looked a little pale.  Blood pressures were 90/60 and they had one that was 80/50.  Pulse in the 70. they called the clinic and was directed to the emergency department.  He is feeling better now.  Patient has been feeling like this the last 2 days or so.  He has been eating and drinking his usual self.  He quit drinking all alcohol approximately 3 weeks ago after seeing his primary care provider.  He states he was drinking a box of wine a night.  He did not have alcohol withdrawal.  He has had high blood pressure \"his entire life\".  He takes Avapro, Toprol-XL and Dyazide.  His pulse stayed in the 70s despite the low blood pressures.  He was not having any chest pain or pressure at the time this was happening.  No shortness of breath.  He has not experienced any chest pressure or dyspnea on exertion.  He recalls shoveling off his roof recently and had no difficulties doing this.  He has no cardiac history.  He has never had a stress test.  Wife also notes that he has been very inactive lately.  Spends most of his time in an easy chair.  Has been taking naps.  Patient and wife state his weight has been stable.  No hair loss or skin changes.  No fluid retention or weight gain.  Wife states he snores but no more than usual and not loudly.  No known history of sleep apnea.  He states that he has been eating and drinking better since he quit drinking all alcohol.  He thinks that he is on too much medication now that he is not drinking.  He denies any recent illness.  No fever or chills.  No known " Covid exposures.  No travel outside of the area.  He denies history of diabetes and no family history of diabetes.  No polyuria polydipsia or polyphagia.  No unexplained weight loss.    Allergies:  Allergies   Allergen Reactions     No Known Drug Allergies        Problem List:    Patient Active Problem List    Diagnosis Date Noted     Hypertension goal BP (blood pressure) < 140/90 2019     Priority: Medium     Tendinopathy of left rotator cuff 2016     Priority: Medium     Hyperlipidemia LDL goal <130 2011     Priority: Medium     Allergic rhinitis 2007     Priority: Medium     Controlled with OTC, ragweed  Problem list name updated by automated process. Provider to review       Cervicalgia 2007     Priority: Medium        Past Medical History:    Past Medical History:   Diagnosis Date     Cervicalgia 2007       Past Surgical History:    Past Surgical History:   Procedure Laterality Date     C REPAIR DETACH RETINA,SCLERAL BUCKLE  1973     COLONOSCOPY  2009    normal     HC KNEE SCOPE,MED/LAT MENISECTOMY      Left knee medial meniscectomy     HC REMOVAL TESTIS,SIMPLE  10/01/2007    Right inguinal orchiectomy.     ZZC NONSPECIFIC PROCEDURE      left ankle surgery       Family History:    Family History   Problem Relation Age of Onset     Hypertension Mother      Hypertension Father      Diabetes Father         adult, late onset     Lipids Father         ?     Allergies Father      Allergies Paternal Grandfather      Allergies Sister      Allergies Sister        Social History:  Marital Status:   [2]  Social History     Tobacco Use     Smoking status: Former Smoker     Packs/day: 0.50     Start date: 1964     Quit date: 2016     Years since quittin.3     Smokeless tobacco: Current User     Tobacco comment: 4 cigs per day   Substance Use Topics     Alcohol use: Yes     Comment: some     Drug use: No        Medications:         atorvastatin (LIPITOR) 40 MG  "tablet       irbesartan (AVAPRO) 300 MG tablet       melatonin 3 MG tablet       metoprolol succinate ER (TOPROL-XL) 25 MG 24 hr tablet       triamterene-HCTZ (DYAZIDE) 37.5-25 MG capsule          Review of Systems   Constitutional: Positive for fatigue. Negative for chills and fever.   HENT: Negative for congestion, sinus pain, sore throat and trouble swallowing.    Eyes: Negative for pain, redness and visual disturbance.   Respiratory: Negative for cough, chest tightness and shortness of breath.    Cardiovascular: Positive for leg swelling. Negative for chest pain and palpitations.   Gastrointestinal: Negative for abdominal pain, diarrhea, nausea and vomiting.   Endocrine: Negative for polydipsia, polyphagia and polyuria.   Genitourinary: Negative for dysuria and frequency.   Musculoskeletal: Positive for arthralgias. Negative for back pain.   Skin: Positive for pallor. Negative for rash.   Allergic/Immunologic: Negative for immunocompromised state.   Neurological: Positive for dizziness, weakness and light-headedness. Negative for headaches.   Psychiatric/Behavioral: Negative for confusion. The patient is not nervous/anxious.    All other systems reviewed and are negative.      Physical Exam   BP: (!) 150/92  Pulse: 78  Temp: 97.4  F (36.3  C)  Resp: 18  Height: 167.6 cm (5' 6\")  Weight: 89.4 kg (197 lb)  SpO2: 99 %  Lying Orthostatic BP: 107/97  Lying Orthostatic Pulse: 59 bpm  Sitting Orthostatic BP: 117/76  Sitting Orthostatic Pulse: 61 bpm  Standing Orthostatic BP: 108/75  Standing Orthostatic Pulse: 67 bpm      Physical Exam  Vitals signs and nursing note reviewed.   Constitutional:       Appearance: Normal appearance. He is obese.      Comments: Alert pleasant smiling obese 65-year-old male in no acute distress.  Is afebrile and his vital signs are otherwise stable.  Here he is actually hypertensive at 150/92.    BP (!) 150/92   Pulse 78   Temp 97.4  F (36.3  C) (Temporal)   Resp 18   Ht 1.676 m (5' 6\") "   Wt 89.4 kg (197 lb)   SpO2 99%   BMI 31.80 kg/m       HENT:      Head: Normocephalic and atraumatic.      Right Ear: Tympanic membrane, ear canal and external ear normal.      Left Ear: Tympanic membrane, ear canal and external ear normal.      Nose: Nose normal.      Mouth/Throat:      Mouth: Mucous membranes are moist.   Eyes:      Conjunctiva/sclera: Conjunctivae normal.   Neck:      Musculoskeletal: Normal range of motion. No muscular tenderness.      Comments: No thyroid enlargement tenderness or nodularity  Cardiovascular:      Rate and Rhythm: Normal rate and regular rhythm.      Pulses: Normal pulses.      Heart sounds: Normal heart sounds.   Pulmonary:      Effort: Pulmonary effort is normal.      Breath sounds: Normal breath sounds.   Abdominal:      General: Abdomen is flat.   Musculoskeletal: Normal range of motion.      Comments: Trace ankle edema   Skin:     General: Skin is warm and dry.      Capillary Refill: Capillary refill takes less than 2 seconds.   Neurological:      General: No focal deficit present.      Mental Status: He is alert and oriented to person, place, and time.   Psychiatric:         Mood and Affect: Mood normal.         Behavior: Behavior normal.         ED Course        Procedures               Critical Care time:  none               Results for orders placed or performed during the hospital encounter of 02/09/21 (from the past 24 hour(s))   CBC with platelets differential   Result Value Ref Range    WBC 8.7 4.0 - 11.0 10e9/L    RBC Count 3.62 (L) 4.4 - 5.9 10e12/L    Hemoglobin 12.1 (L) 13.3 - 17.7 g/dL    Hematocrit 37.1 (L) 40.0 - 53.0 %     (H) 78 - 100 fl    MCH 33.4 (H) 26.5 - 33.0 pg    MCHC 32.6 31.5 - 36.5 g/dL    RDW 11.8 10.0 - 15.0 %    Platelet Count 283 150 - 450 10e9/L    Diff Method Automated Method     % Neutrophils 56.3 %    % Lymphocytes 31.0 %    % Monocytes 9.4 %    % Eosinophils 2.2 %    % Basophils 0.6 %    % Immature Granulocytes 0.5 %     Nucleated RBCs 0 0 /100    Absolute Neutrophil 4.9 1.6 - 8.3 10e9/L    Absolute Lymphocytes 2.7 0.8 - 5.3 10e9/L    Absolute Monocytes 0.8 0.0 - 1.3 10e9/L    Absolute Basophils 0.1 0.0 - 0.2 10e9/L    Abs Immature Granulocytes 0.0 0 - 0.4 10e9/L    Absolute Nucleated RBC 0.0    Comprehensive metabolic panel   Result Value Ref Range    Sodium 140 133 - 144 mmol/L    Potassium 4.4 3.4 - 5.3 mmol/L    Chloride 108 94 - 109 mmol/L    Carbon Dioxide 29 20 - 32 mmol/L    Anion Gap 3 3 - 14 mmol/L    Glucose 103 (H) 70 - 99 mg/dL    Urea Nitrogen 22 7 - 30 mg/dL    Creatinine 1.58 (H) 0.66 - 1.25 mg/dL    GFR Estimate 45 (L) >60 mL/min/[1.73_m2]    GFR Estimate If Black 52 (L) >60 mL/min/[1.73_m2]    Calcium 8.8 8.5 - 10.1 mg/dL    Bilirubin Total 0.4 0.2 - 1.3 mg/dL    Albumin 3.6 3.4 - 5.0 g/dL    Protein Total 7.1 6.8 - 8.8 g/dL    Alkaline Phosphatase 92 40 - 150 U/L     (H) 0 - 70 U/L    AST 41 0 - 45 U/L   Lactic acid whole blood   Result Value Ref Range    Lactic Acid 1.2 0.7 - 2.0 mmol/L   Troponin I   Result Value Ref Range    Troponin I ES <0.015 0.000 - 0.045 ug/L   TSH with free T4 reflex   Result Value Ref Range    TSH 3.04 0.40 - 4.00 mU/L   CRP inflammation   Result Value Ref Range    CRP Inflammation 4.5 0.0 - 8.0 mg/L   Erythrocyte sedimentation rate auto   Result Value Ref Range    Sed Rate 28 (H) 0 - 20 mm/h   UA with Microscopic   Result Value Ref Range    Color Urine Yellow     Appearance Urine Clear     Glucose Urine Negative NEG^Negative mg/dL    Bilirubin Urine Negative NEG^Negative    Ketones Urine Negative NEG^Negative mg/dL    Specific Gravity Urine 1.015 1.003 - 1.035    Blood Urine Negative NEG^Negative    pH Urine 5.0 5.0 - 7.0 pH    Protein Albumin Urine Negative NEG^Negative mg/dL    Urobilinogen mg/dL 0.0 0.0 - 2.0 mg/dL    Nitrite Urine Negative NEG^Negative    Leukocyte Esterase Urine Negative NEG^Negative    Source Midstream Urine     WBC Urine 1 0 - 5 /HPF    RBC Urine 1 0 - 2  /HPF    Squamous Epithelial /HPF Urine 1 0 - 1 /HPF    Mucous Urine Present (A) NEG^Negative /LPF    Hyaline Casts 5 (H) 0 - 2 /LPF       Medications   0.9% sodium chloride BOLUS (0 mLs Intravenous Stopped 2/9/21 1747)       Assessments & Plan (with Medical Decision Making)   SHANNON--65-year-old male with history of hypertension, hyperlipidemia, tobacco abuse-quit 2016 and alcohol abuse quit 3 weeks ago who presents to the emergency room as he was feeling weak lightheaded like he was going to pass out with blood pressures of 90/60 and 80/50.  Patient is on 3 classes of antihypertensives including a beta-blocker of Toprol-XL 25 mg.  His pulse was 70 at the time he had these low blood pressures probably adding to his near syncope and most likely related to the beta-blocker.  The patient has just run out of his Dyazide and would like to discontinue this.  He recalls having this problem in the past and it resolved when he stopped his Dyazide.  His creatinine is slightly elevated at 1.5 and was elevated the last time he had it checked in the clinic but it has been normal previous to this.  He states he was dehydrated and fasting when this checked.  We decided to discontinue his Dyazide and recheck his labs-creatinine in a couple weeks in the clinic.  He also has some mild macrocytic anemia which I suspect is related to his heavy alcohol abuse.  I sent off a B12 and folic acid.  This should correct itself as he has quit drinking.  Patient has a long history of essential hypertension.  He has a follow-up appointment with his primary care provider.  He needs to keep a close eye on his serum creatinine.  This was all discussed at length with the patient and his wife.  They are comfortable with this treatment plan and all their questions answered to their satisfaction and the patient discharged in stable condition.  I have reviewed the nursing notes.    I have reviewed the findings, diagnosis, plan and need for follow up with  the patient.          New Prescriptions    No medications on file       Final diagnoses:   Near syncope   Low blood pressure reading   Creatinine elevation   Elevated MCV   History of alcohol abuse   Essential hypertension   Hyperlipidemia LDL goal <130   History of tobacco abuse       2/9/2021   [facility] EMERGENCY DEPT     Phan, Ernst BULM MD  02/09/21 6571

## 2021-02-09 NOTE — TELEPHONE ENCOUNTER
Patients wife calling as his BP has been very low and would like to review     90/56 yesterday  82/50 today     He is having blurred vision.       Transferred to RN urgent line due to low BP readings

## 2021-02-09 NOTE — ED NOTES
Pts wife notified writer that pt used to drink a box of wine/night. Pt stopped about 2 weeks ago. MD notified.

## 2021-02-09 NOTE — ED TRIAGE NOTES
Pt c/o low bp for a couple days, dizzy/lightheaded. Pt states he is out of the hydrochlorothiazide so did not take meds today. Denies N/V/D. Denies any blood in urine or stool. Denies pain.

## 2021-02-10 LAB — FOLATE SERPL-MCNC: 9.3 NG/ML

## 2021-02-10 NOTE — DISCHARGE INSTRUCTIONS
Follow-up with your primary care provider to recheck your creatinine and blood pressure.  Stop your Dyazide for now.  Continue your other blood pressure medicines.  Return to the emergency department if you are having any return of symptoms of lightheadedness or low blood pressure.

## 2021-02-23 NOTE — PROGRESS NOTES
Assessment & Plan     Need for vaccination  Need for prophylactic vaccination and inoculation against influenza  declined    Hypertension goal BP (blood pressure) < 140/90  - EYE ADULT REFERRAL; Future  - CBC with platelets  - Comprehensive metabolic panel    Left eye pain  - EYE ADULT REFERRAL; Future    Elevated serum creatinine  - CBC with platelets  - Comprehensive metabolic panel    Patient needs to be seen by a diabetic eye for his left eye problems.  He states he had some surgery on this eye some years ago and now has pain related to it.  He was told at one point time that he might have to have corrective surgery done again.  His blood pressure is in good control at this point time.  No changes with respect to medications.  He is sober and has been sober for the last 4 weeks.  Encouraged him to continue to remain sober.  Return in about 3 months (around 5/25/2021) for recheck of current condition, if symptoms do not improve, Medication Recheck.    DAYRON [facility]     Tomy Maldonado is a 65 year old male who presents to clinic today for the following health issues accompanied by his spouse:    History of Present Illness       CKD: He uses over the counter pain medication, including Acetaminophen , three times daily.    Hyperlipidemia:  He presents for follow up of hyperlipidemia.  He is taking medication to lower cholesterol. He is having (muscle and joint aches) myalgia or other side effects to statin medications.    Hypertension: He presents for follow up of hypertension.  He does check blood pressure  regularly outside of the clinic. Outside blood pressures have been over 140/90. He follows a low salt diet.     Migraines:   Since the patient's last clinic visit, headaches are: worsened  The patient is getting headaches:  Daily  He is not able to do normal daily activities when he has a migraine.  The patient is taking the following rescue/relief  "medications:  Tylenol   Patient states \"I get no relief\" from the rescue/relief medications.   The patient is taking the following medications to prevent migraines:  No medications to prevent migraines  In the past 4 weeks, the patient has gone to an Urgent Care or Emergency Room 0 times times due to headaches.    He eats 4 or more servings of fruits and vegetables daily.He consumes 0 sweetened beverage(s) daily.He exercises with enough effort to increase his heart rate 9 or less minutes per day.  He exercises with enough effort to increase his heart rate 3 or less days per week.   He is taking medications regularly.     ED/UC Followup:    Facility:  [facility]  Date of visit: 2/09/2021  Reason for visit: Hypotension   Current Status: has been having some good blood pressure readings, states his diastolic readings have been higher - 80/90. Systolic has been running 101-135. He has been complaining of severe headaches for over two weeks, taking tylenol and has been having eye pain. Denies having chest pain, shortness of breath, heart palpitations. Complains of some lightheadedness/dizziness.          Review of Systems   Constitutional, HEENT, cardiovascular, pulmonary, GI, , musculoskeletal, neuro, skin, endocrine and psych systems are negative, except as otherwise noted.      Objective    /84   Pulse 86   Temp 97.4  F (36.3  C) (Temporal)   Resp 16   Ht 1.675 m (5' 5.95\")   Wt 90 kg (198 lb 8 oz)   SpO2 97%   BMI 32.09 kg/m    Body mass index is 32.09 kg/m .  Physical Exam   GENERAL: healthy, alert and no distress  NECK: no adenopathy, no asymmetry, masses, or scars and thyroid normal to palpation  RESP: lungs clear to auscultation - no rales, rhonchi or wheezes  CV: regular rate and rhythm, normal S1 S2, no S3 or S4, no murmur, click or rub, no peripheral edema and peripheral pulses strong  ABDOMEN: soft, nontender, no hepatosplenomegaly, no masses and bowel sounds normal  MS: no gross " musculoskeletal defects noted, no edema    No results found for this or any previous visit (from the past 24 hour(s)).

## 2021-02-24 RX ORDER — ATORVASTATIN CALCIUM 40 MG/1
TABLET, FILM COATED ORAL
Qty: 90 TABLET | Refills: 0 | OUTPATIENT
Start: 2021-02-24

## 2021-02-25 ENCOUNTER — TRANSFERRED RECORDS (OUTPATIENT)
Dept: HEALTH INFORMATION MANAGEMENT | Facility: CLINIC | Age: 66
End: 2021-02-25

## 2021-02-25 ENCOUNTER — OFFICE VISIT (OUTPATIENT)
Dept: FAMILY MEDICINE | Facility: OTHER | Age: 66
End: 2021-02-25
Payer: COMMERCIAL

## 2021-02-25 VITALS
TEMPERATURE: 97.4 F | SYSTOLIC BLOOD PRESSURE: 130 MMHG | RESPIRATION RATE: 16 BRPM | BODY MASS INDEX: 31.9 KG/M2 | DIASTOLIC BLOOD PRESSURE: 84 MMHG | WEIGHT: 198.5 LBS | HEIGHT: 66 IN | OXYGEN SATURATION: 97 % | HEART RATE: 86 BPM

## 2021-02-25 DIAGNOSIS — R51.9 TEMPORAL HEADACHE: ICD-10-CM

## 2021-02-25 DIAGNOSIS — R79.89 ELEVATED SERUM CREATININE: ICD-10-CM

## 2021-02-25 DIAGNOSIS — H57.12 LEFT EYE PAIN: ICD-10-CM

## 2021-02-25 DIAGNOSIS — Z23 NEED FOR VACCINATION: Primary | ICD-10-CM

## 2021-02-25 DIAGNOSIS — Z23 NEED FOR PROPHYLACTIC VACCINATION AND INOCULATION AGAINST INFLUENZA: ICD-10-CM

## 2021-02-25 DIAGNOSIS — I10 HYPERTENSION GOAL BP (BLOOD PRESSURE) < 140/90: ICD-10-CM

## 2021-02-25 LAB
ALBUMIN SERPL-MCNC: 3.9 G/DL (ref 3.4–5)
ALP SERPL-CCNC: 92 U/L (ref 40–150)
ALT SERPL W P-5'-P-CCNC: 65 U/L (ref 0–70)
ANION GAP SERPL CALCULATED.3IONS-SCNC: 3 MMOL/L (ref 3–14)
AST SERPL W P-5'-P-CCNC: 18 U/L (ref 0–45)
BILIRUB SERPL-MCNC: 0.4 MG/DL (ref 0.2–1.3)
BUN SERPL-MCNC: 14 MG/DL (ref 7–30)
CALCIUM SERPL-MCNC: 9.3 MG/DL (ref 8.5–10.1)
CHLORIDE SERPL-SCNC: 109 MMOL/L (ref 94–109)
CO2 SERPL-SCNC: 29 MMOL/L (ref 20–32)
CREAT SERPL-MCNC: 1.14 MG/DL (ref 0.66–1.25)
CRP SERPL-MCNC: <2.9 MG/L (ref 0–8)
ERYTHROCYTE [DISTWIDTH] IN BLOOD BY AUTOMATED COUNT: 12.2 % (ref 10–15)
ERYTHROCYTE [SEDIMENTATION RATE] IN BLOOD BY WESTERGREN METHOD: 22 MM/H (ref 0–20)
GFR SERPL CREATININE-BSD FRML MDRD: 67 ML/MIN/{1.73_M2}
GLUCOSE SERPL-MCNC: 91 MG/DL (ref 70–99)
HCT VFR BLD AUTO: 37.8 % (ref 40–53)
HGB BLD-MCNC: 12.2 G/DL (ref 13.3–17.7)
MCH RBC QN AUTO: 32.7 PG (ref 26.5–33)
MCHC RBC AUTO-ENTMCNC: 32.3 G/DL (ref 31.5–36.5)
MCV RBC AUTO: 101 FL (ref 78–100)
PLATELET # BLD AUTO: 249 10E9/L (ref 150–450)
POTASSIUM SERPL-SCNC: 4.5 MMOL/L (ref 3.4–5.3)
PROT SERPL-MCNC: 7.4 G/DL (ref 6.8–8.8)
RBC # BLD AUTO: 3.73 10E12/L (ref 4.4–5.9)
SODIUM SERPL-SCNC: 141 MMOL/L (ref 133–144)
WBC # BLD AUTO: 7.4 10E9/L (ref 4–11)

## 2021-02-25 PROCEDURE — 85027 COMPLETE CBC AUTOMATED: CPT | Performed by: OPTOMETRIST

## 2021-02-25 PROCEDURE — 85652 RBC SED RATE AUTOMATED: CPT | Performed by: OPTOMETRIST

## 2021-02-25 PROCEDURE — 36415 COLL VENOUS BLD VENIPUNCTURE: CPT | Performed by: OPTOMETRIST

## 2021-02-25 PROCEDURE — 80053 COMPREHEN METABOLIC PANEL: CPT | Performed by: OPTOMETRIST

## 2021-02-25 PROCEDURE — 99214 OFFICE O/P EST MOD 30 MIN: CPT | Performed by: PHYSICIAN ASSISTANT

## 2021-02-25 PROCEDURE — 86140 C-REACTIVE PROTEIN: CPT | Performed by: OPTOMETRIST

## 2021-02-25 RX ORDER — ACETAMINOPHEN 500 MG
500-1000 TABLET ORAL EVERY 6 HOURS PRN
COMMUNITY
End: 2023-01-31

## 2021-02-25 RX ORDER — CETIRIZINE HYDROCHLORIDE 10 MG/1
10 TABLET ORAL DAILY
COMMUNITY
End: 2023-01-31

## 2021-02-25 ASSESSMENT — MIFFLIN-ST. JEOR: SCORE: 1627.27

## 2021-03-11 DIAGNOSIS — I10 HYPERTENSION GOAL BP (BLOOD PRESSURE) < 140/90: ICD-10-CM

## 2021-03-11 RX ORDER — IRBESARTAN 300 MG/1
TABLET ORAL
Qty: 90 TABLET | Refills: 1 | Status: SHIPPED | OUTPATIENT
Start: 2021-03-11 | End: 2021-09-07

## 2021-04-05 DIAGNOSIS — I10 HYPERTENSION GOAL BP (BLOOD PRESSURE) < 140/90: ICD-10-CM

## 2021-04-06 RX ORDER — METOPROLOL SUCCINATE 25 MG/1
TABLET, EXTENDED RELEASE ORAL
Qty: 90 TABLET | Refills: 0 | Status: SHIPPED | OUTPATIENT
Start: 2021-04-06 | End: 2021-07-12

## 2021-05-25 ENCOUNTER — MYC REFILL (OUTPATIENT)
Dept: FAMILY MEDICINE | Facility: OTHER | Age: 66
End: 2021-05-25

## 2021-05-25 DIAGNOSIS — E78.5 HYPERLIPIDEMIA LDL GOAL <130: Primary | ICD-10-CM

## 2021-05-27 RX ORDER — ATORVASTATIN CALCIUM 40 MG/1
40 TABLET, FILM COATED ORAL DAILY
Qty: 90 TABLET | Refills: 0 | Status: SHIPPED | OUTPATIENT
Start: 2021-05-27 | End: 2022-03-10

## 2021-07-10 DIAGNOSIS — I10 HYPERTENSION GOAL BP (BLOOD PRESSURE) < 140/90: ICD-10-CM

## 2021-07-12 RX ORDER — METOPROLOL SUCCINATE 25 MG/1
TABLET, EXTENDED RELEASE ORAL
Qty: 90 TABLET | Refills: 0 | Status: SHIPPED | OUTPATIENT
Start: 2021-07-12 | End: 2022-02-15

## 2021-09-06 DIAGNOSIS — I10 HYPERTENSION GOAL BP (BLOOD PRESSURE) < 140/90: ICD-10-CM

## 2021-09-07 RX ORDER — IRBESARTAN 300 MG/1
TABLET ORAL
Qty: 90 TABLET | Refills: 0 | Status: SHIPPED | OUTPATIENT
Start: 2021-09-07 | End: 2022-02-15

## 2021-10-23 ENCOUNTER — HEALTH MAINTENANCE LETTER (OUTPATIENT)
Age: 66
End: 2021-10-23

## 2022-02-12 ENCOUNTER — HEALTH MAINTENANCE LETTER (OUTPATIENT)
Age: 67
End: 2022-02-12

## 2022-02-15 ENCOUNTER — MYC REFILL (OUTPATIENT)
Dept: FAMILY MEDICINE | Facility: OTHER | Age: 67
End: 2022-02-15
Payer: COMMERCIAL

## 2022-02-15 DIAGNOSIS — I10 HYPERTENSION GOAL BP (BLOOD PRESSURE) < 140/90: ICD-10-CM

## 2022-02-15 NOTE — TELEPHONE ENCOUNTER
Pending Prescriptions:                       Disp   Refills    metoprolol succinate ER (TOPROL-XL) 25 MG *90 tab*0        Sig: Take 1 tablet (25 mg) by mouth daily    irbesartan (AVAPRO) 300 MG tablet          90 tab*0        Sig: Take 1 tablet (300 mg) by mouth daily        Routing refill request to provider for review/approval because:  A break in medication    KIP DhillonN, RN, PHN  [place]  February 15, 2022

## 2022-02-16 ENCOUNTER — VIRTUAL VISIT (OUTPATIENT)
Dept: FAMILY MEDICINE | Facility: CLINIC | Age: 67
End: 2022-02-16
Payer: COMMERCIAL

## 2022-02-16 DIAGNOSIS — F43.23 ADJUSTMENT DISORDER WITH MIXED ANXIETY AND DEPRESSED MOOD: ICD-10-CM

## 2022-02-16 DIAGNOSIS — F43.23 ADJUSTMENT DISORDER WITH MIXED ANXIETY AND DEPRESSED MOOD: Primary | ICD-10-CM

## 2022-02-16 PROCEDURE — 99214 OFFICE O/P EST MOD 30 MIN: CPT | Mod: 95 | Performed by: FAMILY MEDICINE

## 2022-02-16 PROCEDURE — 96127 BRIEF EMOTIONAL/BEHAV ASSMT: CPT | Mod: 95 | Performed by: FAMILY MEDICINE

## 2022-02-16 RX ORDER — ALPRAZOLAM 0.5 MG
0.5 TABLET ORAL 3 TIMES DAILY PRN
Qty: 20 TABLET | Refills: 0 | Status: SHIPPED | OUTPATIENT
Start: 2022-02-16 | End: 2022-02-16

## 2022-02-16 RX ORDER — ALPRAZOLAM 0.5 MG
0.5 TABLET ORAL 3 TIMES DAILY PRN
Qty: 20 TABLET | Refills: 0 | Status: SHIPPED | OUTPATIENT
Start: 2022-02-16 | End: 2022-03-10

## 2022-02-16 RX ORDER — IRBESARTAN 300 MG/1
300 TABLET ORAL DAILY
Qty: 90 TABLET | Refills: 0 | Status: SHIPPED | OUTPATIENT
Start: 2022-02-16 | End: 2022-03-10

## 2022-02-16 RX ORDER — ESCITALOPRAM OXALATE 10 MG/1
10 TABLET ORAL DAILY
Qty: 30 TABLET | Refills: 1 | Status: SHIPPED | OUTPATIENT
Start: 2022-02-16 | End: 2022-03-10

## 2022-02-16 RX ORDER — METOPROLOL SUCCINATE 25 MG/1
25 TABLET, EXTENDED RELEASE ORAL DAILY
Qty: 90 TABLET | Refills: 0 | Status: SHIPPED | OUTPATIENT
Start: 2022-02-16 | End: 2022-03-10

## 2022-02-16 ASSESSMENT — PATIENT HEALTH QUESTIONNAIRE - PHQ9
10. IF YOU CHECKED OFF ANY PROBLEMS, HOW DIFFICULT HAVE THESE PROBLEMS MADE IT FOR YOU TO DO YOUR WORK, TAKE CARE OF THINGS AT HOME, OR GET ALONG WITH OTHER PEOPLE: EXTREMELY DIFFICULT
SUM OF ALL RESPONSES TO PHQ QUESTIONS 1-9: 22
SUM OF ALL RESPONSES TO PHQ QUESTIONS 1-9: 22

## 2022-02-16 ASSESSMENT — ANXIETY QUESTIONNAIRES
GAD7 TOTAL SCORE: 21
4. TROUBLE RELAXING: NEARLY EVERY DAY
5. BEING SO RESTLESS THAT IT IS HARD TO SIT STILL: NEARLY EVERY DAY
1. FEELING NERVOUS, ANXIOUS, OR ON EDGE: NEARLY EVERY DAY
GAD7 TOTAL SCORE: 21
3. WORRYING TOO MUCH ABOUT DIFFERENT THINGS: NEARLY EVERY DAY
6. BECOMING EASILY ANNOYED OR IRRITABLE: NEARLY EVERY DAY
7. FEELING AFRAID AS IF SOMETHING AWFUL MIGHT HAPPEN: NEARLY EVERY DAY
7. FEELING AFRAID AS IF SOMETHING AWFUL MIGHT HAPPEN: NEARLY EVERY DAY
2. NOT BEING ABLE TO STOP OR CONTROL WORRYING: NEARLY EVERY DAY
GAD7 TOTAL SCORE: 21

## 2022-02-16 NOTE — TELEPHONE ENCOUNTER
Reason for Call:  Other call back    Detailed comments: Wife called stating her  had an appointment today with Dr. Denny and he prescribed him Xanax. She states there is some kind of delay on the insurance side and not sure how long it will take for that to go through. So she is wondering if there is any way another prescription could be sent as a self pay with a smaller quantity to cover until the insurance goes through. She is really wanting to get him started on something today and is concerned it could take days for insurance to respond.     Phone Number Patient can be reached at: Home number on file [phone] (home)    Best Time: Any    Can we leave a detailed message on this number? YES    Call taken on 2/16/2022 at 2:14 PM by Radha Bush

## 2022-02-16 NOTE — PROGRESS NOTES
"Barrett is a 66 year old who is being evaluated via a billable telephone visit.      What phone number would you like to be contacted at? [phone]  How would you like to obtain your AVS? MyChart    Assessment & Plan     Adjustment disorder with mixed anxiety and depressed mood  Rather acute onset in the next 2 weeks.  Strangely he has become extremely anxious after a upper respiratory type infection.  He is tested twice Covid and negative.  His symptoms are gone now but extreme anxiety and breaking down into tears.  Unable to sleep.  We will try him on Lexapro his daughters are on this and some Xanax for panic attacks put in an order for mental health evaluation.  He is with his wife now stable no concerns for any suicidal gestures  - escitalopram (LEXAPRO) 10 MG tablet; Take 1 tablet (10 mg) by mouth daily  - ALPRAZolam (XANAX) 0.5 MG tablet; Take 1 tablet (0.5 mg) by mouth 3 times daily as needed for anxiety  - Adult Mental Health  Referral; Future             BMI:   Estimated body mass index is 32.09 kg/m  as calculated from the following:    Height as of 2/25/21: 1.675 m (5' 5.95\").    Weight as of 2/25/21: 90 kg (198 lb 8 oz).       Depression Screening Follow Up    PHQ 2/16/2022   PHQ-9 Total Score 22   Q9: Thoughts of better off dead/self-harm past 2 weeks Several days   F/U: Thoughts of suicide or self-harm No   F/U: Safety concerns No                 Follow Up    Follow Up Actions Taken      Discussed the following ways the patient can remain in a safe environment:  be around others      No follow-ups on file.    Russell Denny MD  [facility]   Barrett is a 66 year old who presents for the following health issues  accompanied by his spouse.  As noted below.  Sudden onset of fear panic following an upper respiratory infection.  Anxious cannot sleep breaks down crying.    HPI     Abnormal Mood Symptoms  Onset/Duration: 1 day  Description: Anxiety " attack  Depression (if yes, do PHQ-9): YES  Anxiety (if yes, do HEATHER-7): YES  Accompanying Signs & Symptoms:  Still participating in activities that you used to enjoy: no  Fatigue: YES  Irritability: no  Difficulty concentrating: YES  Changes in appetite: YES  Problems with sleep: YES  Heart racing/beating fast: no  Abnormally elevated, expansive, or irritable mood: YES- is in tears and wife is currently talking to writer   Persistently increased activity or energy: no  Thoughts of hurting yourself or others: no  History:  Recent stress or major life event: YES- was sick with cold like symptoms on 2/1/2022 now is feeling like he can't breath. Two negative home test's in last week.  Prior depression or anxiety: None  Family history of depression or anxiety: YES- major   Alcohol/drug use: not for last year  Difficulty sleeping: YES  Precipitating or alleviating factors: None  Therapies tried and outcome: none and has only been 1 day.   PHQ 2/16/2022   PHQ-9 Total Score 22   Q9: Thoughts of better off dead/self-harm past 2 weeks Several days   F/U: Thoughts of suicide or self-harm No   F/U: Safety concerns No     HEATHER-7 SCORE 2/16/2022   Total Score 21 (severe anxiety)   Total Score 21         Review of Systems   Constitutional, HEENT, cardiovascular, pulmonary, gi and gu systems are negative, except as otherwise noted.      Objective           Vitals:  No vitals were obtained today due to virtual visit.    Physical Exam   healthy, alert, moderate distress, cooperative and crying  PSYCH: Alert and oriented times 3; coherent speech, normal   rate and volume, able to articulate logical thoughts, able   to abstract reason, no tangential thoughts, no hallucinations   or delusions  His affect is anxious and tearful  RESP: No cough, no audible wheezing, able to talk in full sentences  Remainder of exam unable to be completed due to telephone visits                Phone call duration: 15 minutes  Answers for HPI/ROS submitted  by the patient on 2/16/2022  If you checked off any problems, how difficult have these problems made it for you to do your work, take care of things at home, or get along with other people?: Extremely difficult  PHQ9 TOTAL SCORE: 22  HEATHER 7 TOTAL SCORE: 21

## 2022-02-17 ASSESSMENT — PATIENT HEALTH QUESTIONNAIRE - PHQ9: SUM OF ALL RESPONSES TO PHQ QUESTIONS 1-9: 22

## 2022-02-17 ASSESSMENT — ANXIETY QUESTIONNAIRES: GAD7 TOTAL SCORE: 21

## 2022-03-10 ENCOUNTER — OFFICE VISIT (OUTPATIENT)
Dept: FAMILY MEDICINE | Facility: OTHER | Age: 67
End: 2022-03-10
Payer: COMMERCIAL

## 2022-03-10 VITALS
DIASTOLIC BLOOD PRESSURE: 80 MMHG | TEMPERATURE: 97 F | OXYGEN SATURATION: 98 % | HEART RATE: 51 BPM | RESPIRATION RATE: 20 BRPM | WEIGHT: 170.8 LBS | SYSTOLIC BLOOD PRESSURE: 120 MMHG | BODY MASS INDEX: 27.45 KG/M2 | HEIGHT: 66 IN

## 2022-03-10 DIAGNOSIS — Z12.11 SCREEN FOR COLON CANCER: ICD-10-CM

## 2022-03-10 DIAGNOSIS — Z00.00 WELLNESS EXAMINATION: ICD-10-CM

## 2022-03-10 DIAGNOSIS — Z12.5 ENCOUNTER FOR SCREENING FOR MALIGNANT NEOPLASM OF PROSTATE: ICD-10-CM

## 2022-03-10 DIAGNOSIS — I10 HYPERTENSION GOAL BP (BLOOD PRESSURE) < 140/90: ICD-10-CM

## 2022-03-10 DIAGNOSIS — Z00.00 ROUTINE HISTORY AND PHYSICAL EXAMINATION OF ADULT: Primary | ICD-10-CM

## 2022-03-10 DIAGNOSIS — E78.5 HYPERLIPIDEMIA LDL GOAL <130: ICD-10-CM

## 2022-03-10 DIAGNOSIS — F43.23 ADJUSTMENT DISORDER WITH MIXED ANXIETY AND DEPRESSED MOOD: ICD-10-CM

## 2022-03-10 PROCEDURE — 99214 OFFICE O/P EST MOD 30 MIN: CPT | Mod: 25 | Performed by: PHYSICIAN ASSISTANT

## 2022-03-10 PROCEDURE — G0438 PPPS, INITIAL VISIT: HCPCS | Performed by: PHYSICIAN ASSISTANT

## 2022-03-10 RX ORDER — IRBESARTAN 300 MG/1
300 TABLET ORAL DAILY
Qty: 90 TABLET | Refills: 3 | Status: SHIPPED | OUTPATIENT
Start: 2022-03-10 | End: 2022-05-24

## 2022-03-10 RX ORDER — ALPRAZOLAM 0.5 MG
0.5 TABLET ORAL 3 TIMES DAILY PRN
Qty: 20 TABLET | Refills: 0 | Status: SHIPPED | OUTPATIENT
Start: 2022-03-10 | End: 2022-05-17

## 2022-03-10 RX ORDER — METOPROLOL SUCCINATE 25 MG/1
25 TABLET, EXTENDED RELEASE ORAL DAILY
Qty: 90 TABLET | Refills: 3 | Status: SHIPPED | OUTPATIENT
Start: 2022-03-10 | End: 2022-05-24

## 2022-03-10 RX ORDER — ESCITALOPRAM OXALATE 20 MG/1
20 TABLET ORAL DAILY
Qty: 90 TABLET | Refills: 3 | Status: SHIPPED | OUTPATIENT
Start: 2022-03-10 | End: 2023-01-31

## 2022-03-10 ASSESSMENT — ANXIETY QUESTIONNAIRES
6. BECOMING EASILY ANNOYED OR IRRITABLE: NOT AT ALL
IF YOU CHECKED OFF ANY PROBLEMS ON THIS QUESTIONNAIRE, HOW DIFFICULT HAVE THESE PROBLEMS MADE IT FOR YOU TO DO YOUR WORK, TAKE CARE OF THINGS AT HOME, OR GET ALONG WITH OTHER PEOPLE: SOMEWHAT DIFFICULT
1. FEELING NERVOUS, ANXIOUS, OR ON EDGE: MORE THAN HALF THE DAYS
2. NOT BEING ABLE TO STOP OR CONTROL WORRYING: MORE THAN HALF THE DAYS
GAD7 TOTAL SCORE: 11
7. FEELING AFRAID AS IF SOMETHING AWFUL MIGHT HAPPEN: NEARLY EVERY DAY
5. BEING SO RESTLESS THAT IT IS HARD TO SIT STILL: SEVERAL DAYS
3. WORRYING TOO MUCH ABOUT DIFFERENT THINGS: MORE THAN HALF THE DAYS

## 2022-03-10 ASSESSMENT — PATIENT HEALTH QUESTIONNAIRE - PHQ9: 5. POOR APPETITE OR OVEREATING: SEVERAL DAYS

## 2022-03-10 NOTE — PROGRESS NOTES
Assessment & Plan     Routine history and physical examination of adult  66-year-old male here for routine physical and medication update.  He is not fasting today.  We discussed the fact that he should get some labs done in the near future.  Follow-up in 1 year for routine exams  - Lipid panel reflex to direct LDL Fasting; Future  - Comprehensive metabolic panel (BMP + Alb, Alk Phos, ALT, AST, Total. Bili, TP); Future  - CBC with platelets; Future  - PSA, screen; Future  - TSH with free T4 reflex; Future    Wellness examination  As above    Adjustment disorder with mixed anxiety and depressed mood  Improved with Lexapro on board will increase his dose to 20 mg daily.  He does understand that alprazolam is an emergency medication only.  Follow-up in 2 to 3 months is advised.  - ALPRAZolam (XANAX) 0.5 MG tablet; Take 1 tablet (0.5 mg) by mouth 3 times daily as needed for anxiety  - escitalopram (LEXAPRO) 20 MG tablet; Take 1 tablet (20 mg) by mouth daily    Hypertension goal BP (blood pressure) < 140/90  Good control on current medications no new concerns.  Follow-up in 1 year.  - irbesartan (AVAPRO) 300 MG tablet; Take 1 tablet (300 mg) by mouth daily  - metoprolol succinate ER (TOPROL-XL) 25 MG 24 hr tablet; Take 1 tablet (25 mg) by mouth daily    Hyperlipidemia LDL goal <130  Goal discussed.  Labs pending.  Base treatment on results he has made big changes with respect to diet and exercise and we suspect that his cholesterol might be much improved.  There may be a family predisposition for high cholesterol though and further evaluation will be done based on results.  - Lipid panel reflex to direct LDL Fasting; Future    Screen for colon cancer  Due for screening  - Adult Gastro Ref - Procedure Only; Future    Encounter for screening for malignant neoplasm of prostate   Due for screening  - PSA, screen; Future             BMI:   Estimated body mass index is 27.78 kg/m  as calculated from the following:    Height  "as of this encounter: 1.67 m (5' 5.75\").    Weight as of this encounter: 77.5 kg (170 lb 12.8 oz).   Weight management plan: Discussed healthy diet and exercise guidelines    Work on weight loss  Regular exercise  No follow-ups on file.    DAYRON Cagle [facility] ARABELLA Hyde is a 66 year old who presents for the following health issues  accompanied by his spouse.    HPI     Anxiety Follow-Up    How are you doing with your anxiety since your last visit? Improved     Are you having other symptoms that might be associated with anxiety? Yes:  Sleeping    Have you had a significant life event? No     Are you feeling depressed?     Do you have any concerns with your use of alcohol or other drugs? No    Social History     Tobacco Use     Smoking status: Former Smoker     Packs/day: 0.50     Start date: 1964     Quit date: 2016     Years since quittin.4     Smokeless tobacco: Current User     Tobacco comment: 4 cigs per day   Substance Use Topics     Alcohol use: Yes     Comment: some     Drug use: No     HEATHER-7 SCORE 2022   Total Score 21 (severe anxiety)   Total Score 21     PHQ 2022   PHQ-9 Total Score 22   Q9: Thoughts of better off dead/self-harm past 2 weeks Several days   F/U: Thoughts of suicide or self-harm No   F/U: Safety concerns No     HEATHER-7  3/10/2022   1. Feeling nervous, anxious, or on edge 2   2. Not being able to stop or control worrying 2   3. Worrying too much about different things 2   4. Trouble relaxing 1   5. Being so restless that it is hard to sit still 1   6. Becoming easily annoyed or irritable 0   7. Feeling afraid, as if something awful might happen 3   HEATHER-7 Total Score 11   If you checked any problems, how difficult have they made it for you to do your work, take care of things at home, or get along with other people? Somewhat difficult         How many servings of fruits and vegetables do you eat daily?  4 or more    On average, " "how many sweetened beverages do you drink each day (Examples: soda, juice, sweet tea, etc.  Do NOT count diet or artificially sweetened beverages)?   0    How many days per week do you exercise enough to make your heart beat faster? 7    How many minutes a day do you exercise enough to make your heart beat faster? 60 or more    How many days per week do you miss taking your medication? 0        Review of Systems   Constitutional, HEENT, cardiovascular, pulmonary, GI, , musculoskeletal, neuro, skin, endocrine and psych systems are negative, except as otherwise noted.      Objective    /80   Pulse 51   Temp 97  F (36.1  C) (Temporal)   Resp 20   Ht 1.67 m (5' 5.75\")   Wt 77.5 kg (170 lb 12.8 oz)   SpO2 98%   BMI 27.78 kg/m    Body mass index is 27.78 kg/m .  Physical Exam   GENERAL: healthy, alert and no distress  EYES: Eyes grossly normal to inspection, PERRL and conjunctivae and sclerae normal  HENT: ear canals and TM's normal, nose and mouth without ulcers or lesions  NECK: no adenopathy, no asymmetry, masses, or scars and thyroid normal to palpation  RESP: lungs clear to auscultation - no rales, rhonchi or wheezes  CV: regular rate and rhythm, normal S1 S2, no S3 or S4, no murmur, click or rub, no peripheral edema and peripheral pulses strong  ABDOMEN: soft, nontender, no hepatosplenomegaly, no masses and bowel sounds normal  MS: no gross musculoskeletal defects noted, no edema  SKIN: no suspicious lesions or rashes to exposed visible skin today.  NEURO: Normal strength and tone, mentation intact and speech normal  PSYCH: mentation appears normal, affect normal/bright    No results found for this or any previous visit (from the past 24 hour(s)).            "

## 2022-03-11 ASSESSMENT — ANXIETY QUESTIONNAIRES: GAD7 TOTAL SCORE: 11

## 2022-03-14 ENCOUNTER — TELEPHONE (OUTPATIENT)
Dept: GASTROENTEROLOGY | Facility: CLINIC | Age: 67
End: 2022-03-14
Payer: COMMERCIAL

## 2022-03-14 NOTE — TELEPHONE ENCOUNTER
Screening Questions  BlueKIND OF PREP RedLOCATION [review exclusion criteria] GreenSEDATION TYPE    1. Have you had a positive covid test in the last 90 days? N     2. Are you active on mychart? Y    3. What insurance is in the chart? [employer]/MEDICARE     3.  Ordering/Referring Provider: Demetris Mendiola      4. BMI 27.4 [BMI OVER 40-EXTENDED PREP]  If greater than 40 review exclusion criteria [PAC APPT IF @ [facility]]    5.  Respiratory Screening :  [If yes to any of the following HOSPITAL setting only]     Do you use daily home oxygen? N    Do you have mod to severe Obstructive Sleep Apnea? N  [OKAY @ [facility] SH PH RI]   Do you have Pulmonary Hypertension? N     Do you have UNCONTROLLED asthma? N      6. Have you had a heart or lung transplant? N      7. Are you currently on dialysis? N [ If yes, G-PREP & HOSPITAL setting only]     8. Do you have chronic kidney disease? N [ If yes, G-PREP ]    9. Have you had a stroke or Transient ischemic attack (TIA) within 6 months? N (If yes, please review exclusion criteria)    10. In the past 6 months, have you had any heart related issues including cardiomyopathy or heart attack? N        If yes, did it require cardiac stenting or other implantable device?       11. Do you have any implantable devices in your body (pacemaker, defib, LVAD)? N (If yes, please review exclusion criteria)    12. Do you take nitroglycerin? N   If yes, how often? N  (if yes, HOSPITAL setting ONLY)    13. Are you currently taking any blood thinners? N   [IF YES, INFORM PATIENT TO FOLLOW UP W/ ORDERING PROVIDER FOR BRIDGING INSTRUCTIONS]     14. Do you have a diagnosis of diabetes? N   [ If yes, G-PREP ]    15. [FEMALES] Are you currently pregnant?     If yes, how many weeks?     16. Are you taking any prescription pain medications on a routine schedule?  N  [ If yes, EXTENDED PREP.] [If yes, MAC]    17. Do you have any chemical dependencies such as alcohol, street drugs, or methadone?  N [If yes,  MAC]    18. Do you have any history of post-traumatic stress syndrome, severe anxiety or history of psychosis?  N  [If yes, MAC]    19. Do you transfer independently?  Y    20.  Do you have any issues with constipation?  N  [ If yes, EXTENDED PREP.]    21. Preferred LOCAL Pharmacy for Pre Prescription       [id number] - [place], [place] - [address]  Scheduling Details      Caller : Tomy Maldonado  (Please ask for phone number if not scheduled by patient)    Type of Procedure Scheduled: COLON  Which Colonoscopy Prep was Sent?: MIRALAHARDEEP  KHORUTS CF PATIENTS & GROEN'S PATIENTS NEEDS EXTENDED PREP  Surgeon: MATTEO  Date of Procedure: 5/20/22  Location:       Sedation Type: MAC  Conscious Sedation- Needs  for 6 hours after the procedure  MAC/General-Needs  for 24 hours after procedure    Pre-op Required at [facility], [place], [place] and OR for MAC sedation:   (advise patient they will need a pre-op prior to procedure -)      Informed patient they will need an adult  Y  Cannot take any type of public or medical transportation alone    Pre-Procedure Covid test to be [facility] or Externally: ER 5/16    Confirmed Nurse will call to complete assessment Y    Additional comments:

## 2022-04-10 DIAGNOSIS — Z11.59 ENCOUNTER FOR SCREENING FOR OTHER VIRAL DISEASES: Primary | ICD-10-CM

## 2022-05-06 NOTE — TELEPHONE ENCOUNTER
Called patient and left a message. Need to schedule a virtual visit per note below. Thank you  
Called patient left a message. Need to schedule a medication check per note below. Thank you  
Letter sent   
Pending Prescriptions:                       Disp   Refills    irbesartan (AVAPRO) 300 MG tablet [Pharma*90 tab*0            Sig: Take 1 tablet by mouth once daily    Medication is being filled for 1 time raul refill only due to:  Patient is due for med check    Please call and help schedule.  Thank you!            
[id number]

## 2022-05-16 ENCOUNTER — LAB (OUTPATIENT)
Dept: LAB | Facility: OTHER | Age: 67
End: 2022-05-16
Payer: COMMERCIAL

## 2022-05-16 DIAGNOSIS — Z13.220 SCREENING FOR HYPERLIPIDEMIA: ICD-10-CM

## 2022-05-16 DIAGNOSIS — I10 HYPERTENSION GOAL BP (BLOOD PRESSURE) < 140/90: ICD-10-CM

## 2022-05-16 DIAGNOSIS — Z11.59 ENCOUNTER FOR SCREENING FOR OTHER VIRAL DISEASES: ICD-10-CM

## 2022-05-16 DIAGNOSIS — F43.23 ADJUSTMENT DISORDER WITH MIXED ANXIETY AND DEPRESSED MOOD: ICD-10-CM

## 2022-05-16 PROCEDURE — U0005 INFEC AGEN DETEC AMPLI PROBE: HCPCS

## 2022-05-16 PROCEDURE — U0003 INFECTIOUS AGENT DETECTION BY NUCLEIC ACID (DNA OR RNA); SEVERE ACUTE RESPIRATORY SYNDROME CORONAVIRUS 2 (SARS-COV-2) (CORONAVIRUS DISEASE [COVID-19]), AMPLIFIED PROBE TECHNIQUE, MAKING USE OF HIGH THROUGHPUT TECHNOLOGIES AS DESCRIBED BY CMS-2020-01-R: HCPCS

## 2022-05-17 LAB — SARS-COV-2 RNA RESP QL NAA+PROBE: NEGATIVE

## 2022-05-17 RX ORDER — ALPRAZOLAM 0.5 MG
TABLET ORAL
Qty: 20 TABLET | Refills: 0 | Status: SHIPPED | OUTPATIENT
Start: 2022-05-17 | End: 2022-08-29

## 2022-05-17 NOTE — TELEPHONE ENCOUNTER
Pending Prescriptions:                       Disp   Refills    ALPRAZolam (XANAX) 0.5 MG tablet [Pharmacy*20 tab*0        Sig: TAKE 1 TABLET BY MOUTH THREE TIMES DAILY AS NEEDED           FOR ANXIETY        Routing refill request to provider for review/approval because:  Drug not on the [facility] refill protocol     Jolynn Oela, KIPN, RN, PHN  [facility] RN for [place]  May 17, 2022

## 2022-05-19 NOTE — H&P
[facility] History and Physical    Tomy Maldonado MRN# [id number]   Age: 67 year old YOB: 1955     Date of Admission:  (Not on file)    Home clinic: [facility]  Primary care provider: Demetris Ham          Impression and Plan:   Impression:   Screen for colon cancer [Z12.11]  Last colonoscopy -normal      Plan:   Proceed to Colonoscopy as planned.  The procedure, risks(bleeding, perforation), benefits and alternatives were discussed and the patient agrees to proceed. Cleared for Anesthesia             Chief Complaint:   Screen for colon cancer [Z12.11]    History is obtained from the patient          History of Present Illness:   This 67 year old male is being seen at this time for evaluation for colonoscopy.  No complaints or family hx.            Past Medical History:     Past Medical History:   Diagnosis Date     Cervicalgia 2007            Past Surgical History:     Past Surgical History:   Procedure Laterality Date     COLONOSCOPY  2009    normal     HC KNEE SCOPE,MED/LAT MENISECTOMY      Left knee medial meniscectomy     HC REMOVAL TESTIS,SIMPLE  10/01/2007    Right inguinal orchiectomy.     ZZC NONSPECIFIC PROCEDURE      left ankle surgery     ZZC REPAIR DETACH RETINA,SCLERAL BUCKLE              Social History:     Social History     Tobacco Use     Smoking status: Former Smoker     Packs/day: 0.50     Start date: 1964     Quit date: 2016     Years since quittin.6     Smokeless tobacco: Never Used     Tobacco comment: 4 cigs per day   Substance Use Topics     Alcohol use: Not Currently            Family History:     Family History   Problem Relation Age of Onset     Hypertension Mother      Hypertension Father      Diabetes Father         adult, late onset     Lipids Father         ?     Allergies Father      Allergies Paternal Grandfather      Allergies Sister      Allergies Sister             Immunizations:     VACCINE/DOSE    Diptheria   DPT   DTAP   HBIG   Hepatitis A   Hepatitis B   HIB   Influenza   Measles   Meningococcal   MMR   Mumps   Pneumococcal   Polio   Rubella   Small Pox   TDAP   Varicella   Zoster            Allergies:     Allergies   Allergen Reactions     No Known Drug Allergies             Medications:     No current facility-administered medications for this encounter.     Current Outpatient Medications   Medication Sig     ALPRAZolam (XANAX) 0.5 MG tablet TAKE 1 TABLET BY MOUTH THREE TIMES DAILY AS NEEDED FOR ANXIETY     acetaminophen (TYLENOL) 500 MG tablet Take 500-1,000 mg by mouth every 6 hours as needed for mild pain (Patient not taking: Reported on 2/16/2022)     cetirizine (ZYRTEC) 10 MG tablet Take 10 mg by mouth daily     escitalopram (LEXAPRO) 20 MG tablet Take 1 tablet (20 mg) by mouth daily     irbesartan (AVAPRO) 300 MG tablet Take 1 tablet (300 mg) by mouth daily     metoprolol succinate ER (TOPROL-XL) 25 MG 24 hr tablet Take 1 tablet (25 mg) by mouth daily             Review of Systems:   The review of systems was positive for the following findings.  None.  The remainder of the review of systems was unremarkable.          Physical Exam:   All vitals have been reviewed  There were no vitals taken for this visit.  No intake or output data in the 24 hours ending 05/19/22 0814  SHEENT examination revealed NC/AT, EOMI.  Examination of the chest revealed CTA.  Examination of the heart revealed RRR.  Examination of the abdomen revealed soft, non tender.  The neuromuscular examination was NL.          Data:   All laboratory data reviewed  No results found for any visits on 05/20/22.       Hi Owens MD, FACS

## 2022-05-20 ENCOUNTER — HOSPITAL ENCOUNTER (OUTPATIENT)
Facility: CLINIC | Age: 67
Discharge: HOME | End: 2022-05-20
Attending: SPECIALIST | Admitting: SPECIALIST
Payer: COMMERCIAL

## 2022-05-20 ENCOUNTER — ANESTHESIA (OUTPATIENT)
Dept: GASTROENTEROLOGY | Facility: CLINIC | Age: 67
End: 2022-05-20
Payer: COMMERCIAL

## 2022-05-20 ENCOUNTER — ANESTHESIA EVENT (OUTPATIENT)
Dept: GASTROENTEROLOGY | Facility: CLINIC | Age: 67
End: 2022-05-20
Payer: COMMERCIAL

## 2022-05-20 VITALS
HEART RATE: 53 BPM | RESPIRATION RATE: 16 BRPM | SYSTOLIC BLOOD PRESSURE: 120 MMHG | OXYGEN SATURATION: 96 % | DIASTOLIC BLOOD PRESSURE: 79 MMHG | TEMPERATURE: 98 F

## 2022-05-20 LAB — COLONOSCOPY: NORMAL

## 2022-05-20 PROCEDURE — 250N000011 HC RX IP 250 OP 636: Performed by: NURSE ANESTHETIST, CERTIFIED REGISTERED

## 2022-05-20 PROCEDURE — 370N000017 HC ANESTHESIA TECHNICAL FEE, PER MIN: Performed by: SPECIALIST

## 2022-05-20 PROCEDURE — 258N000003 HC RX IP 258 OP 636: Performed by: NURSE ANESTHETIST, CERTIFIED REGISTERED

## 2022-05-20 PROCEDURE — 250N000009 HC RX 250: Performed by: NURSE ANESTHETIST, CERTIFIED REGISTERED

## 2022-05-20 PROCEDURE — 45378 DIAGNOSTIC COLONOSCOPY: CPT | Performed by: SPECIALIST

## 2022-05-20 PROCEDURE — G0121 COLON CA SCRN NOT HI RSK IND: HCPCS | Performed by: SPECIALIST

## 2022-05-20 RX ORDER — LIDOCAINE HYDROCHLORIDE 20 MG/ML
INJECTION, SOLUTION INFILTRATION; PERINEURAL PRN
Status: DISCONTINUED | OUTPATIENT
Start: 2022-05-20 | End: 2022-05-20

## 2022-05-20 RX ORDER — PROPOFOL 10 MG/ML
INJECTION, EMULSION INTRAVENOUS PRN
Status: DISCONTINUED | OUTPATIENT
Start: 2022-05-20 | End: 2022-05-20

## 2022-05-20 RX ORDER — FENTANYL CITRATE-0.9 % NACL/PF 10 MCG/ML
PLASTIC BAG, INJECTION (ML) INTRAVENOUS PRN
Status: DISCONTINUED | OUTPATIENT
Start: 2022-05-20 | End: 2022-05-20

## 2022-05-20 RX ORDER — LIDOCAINE 40 MG/G
CREAM TOPICAL
Status: DISCONTINUED | OUTPATIENT
Start: 2022-05-20 | End: 2022-05-20 | Stop reason: HOSPADM

## 2022-05-20 RX ORDER — PROPOFOL 10 MG/ML
INJECTION, EMULSION INTRAVENOUS CONTINUOUS PRN
Status: DISCONTINUED | OUTPATIENT
Start: 2022-05-20 | End: 2022-05-20

## 2022-05-20 RX ORDER — SODIUM CHLORIDE, SODIUM LACTATE, POTASSIUM CHLORIDE, CALCIUM CHLORIDE 600; 310; 30; 20 MG/100ML; MG/100ML; MG/100ML; MG/100ML
INJECTION, SOLUTION INTRAVENOUS CONTINUOUS
Status: DISCONTINUED | OUTPATIENT
Start: 2022-05-20 | End: 2022-05-20 | Stop reason: HOSPADM

## 2022-05-20 RX ADMIN — PROPOFOL 150 MCG/KG/MIN: 10 INJECTION, EMULSION INTRAVENOUS at 11:55

## 2022-05-20 RX ADMIN — PROPOFOL 100 MG: 10 INJECTION, EMULSION INTRAVENOUS at 11:55

## 2022-05-20 RX ADMIN — LIDOCAINE HYDROCHLORIDE 60 MG: 20 INJECTION, SOLUTION INFILTRATION; PERINEURAL at 11:55

## 2022-05-20 RX ADMIN — LIDOCAINE HYDROCHLORIDE 1 ML: 10 INJECTION, SOLUTION EPIDURAL; INFILTRATION; INTRACAUDAL; PERINEURAL at 11:02

## 2022-05-20 RX ADMIN — Medication 100 MCG: at 12:01

## 2022-05-20 RX ADMIN — SODIUM CHLORIDE, POTASSIUM CHLORIDE, SODIUM LACTATE AND CALCIUM CHLORIDE: 600; 310; 30; 20 INJECTION, SOLUTION INTRAVENOUS at 11:02

## 2022-05-20 NOTE — DISCHARGE INSTRUCTIONS
[facility]    Home Care Following Endoscopy          Activity:  You have just undergone an endoscopic procedure usually performed with conscious sedation.  Do not work or operate machinery (including a car) for at least 12 hours.    I encourage you to walk and attempt to pass this air as soon as possible.    Diet:  Return to the diet you were on before your procedure but eat lightly for the first 12-24 hours.  Drink plenty of water.  Resume any regular medications unless otherwise advised by your physician.  Please begin any new medication prescribed as a result of your procedure as directed by your physician.   Pain:  You may take Tylenol as needed for pain.  Expected Recovery:  You can expect some mild abdominal fullness and/or discomfort due to the air used to inflate your intestinal tract.     Call Your Physician if You Have:    After Colonoscopy:  Worsening persisting abdominal pain which is worse with activity.  Fevers (>101 degrees F), chills or shakes.  Passage of continued blood with bowel movements.     Any questions or concerns about your recovery, please call [phone] or after hours [phone] Nurse Advice Line.

## 2022-05-20 NOTE — ANESTHESIA PREPROCEDURE EVALUATION
Anesthesia Pre-Procedure Evaluation    Patient: Tomy Maldonado   MRN: [id number] : 1955        Procedure : Procedure(s):  COLONOSCOPY          Past Medical History:   Diagnosis Date     Cervicalgia 2007      Past Surgical History:   Procedure Laterality Date     COLONOSCOPY  2009    normal     HC KNEE SCOPE,MED/LAT MENISECTOMY      Left knee medial meniscectomy     HC REMOVAL TESTIS,SIMPLE  10/01/2007    Right inguinal orchiectomy.     ZZC NONSPECIFIC PROCEDURE      left ankle surgery     ZZC REPAIR DETACH RETINA,SCLERAL BUCKLE  1973      Allergies   Allergen Reactions     No Known Drug Allergies       Social History     Tobacco Use     Smoking status: Former Smoker     Packs/day: 0.50     Start date: 1964     Quit date: 2016     Years since quittin.6     Smokeless tobacco: Never Used     Tobacco comment: 4 cigs per day   Substance Use Topics     Alcohol use: Not Currently      Wt Readings from Last 1 Encounters:   03/10/22 77.5 kg (170 lb 12.8 oz)        Anesthesia Evaluation   Pt has had prior anesthetic. Type: General and MAC.    No history of anesthetic complications       ROS/MED HX  ENT/Pulmonary:       Neurologic:  - neg neurologic ROS     Cardiovascular:     (+) hypertension-----Previous cardiac testing   Echo: Date: Results:    Stress Test: Date: Results:    ECG Reviewed: Date:  Results:  - NSR    Cath: Date: Results:      METS/Exercise Tolerance:     Hematologic:  - neg hematologic  ROS     Musculoskeletal: Comment: -Cervicalgia       GI/Hepatic:       Renal/Genitourinary:     (+) renal disease, type: CRI,     Endo:       Psychiatric/Substance Use:  - neg psychiatric ROS     Infectious Disease:       Malignancy:  - neg malignancy ROS     Other:            Physical Exam    Airway        Mallampati: II   TM distance: > 3 FB   Neck ROM: full   Mouth opening: > 3 cm    Respiratory Devices and Support         Dental  no notable dental history         Cardiovascular    cardiovascular exam normal          Pulmonary   pulmonary exam normal                OUTSIDE LABS:  CBC:   Lab Results   Component Value Date    WBC 7.4 02/25/2021    WBC 8.7 02/09/2021    HGB 12.2 (L) 02/25/2021    HGB 12.1 (L) 02/09/2021    HCT 37.8 (L) 02/25/2021    HCT 37.1 (L) 02/09/2021     02/25/2021     02/09/2021     BMP:   Lab Results   Component Value Date     02/25/2021     02/09/2021    POTASSIUM 4.5 02/25/2021    POTASSIUM 4.4 02/09/2021    CHLORIDE 109 02/25/2021    CHLORIDE 108 02/09/2021    CO2 29 02/25/2021    CO2 29 02/09/2021    BUN 14 02/25/2021    BUN 22 02/09/2021    CR 1.14 02/25/2021    CR 1.58 (H) 02/09/2021    GLC 91 02/25/2021     (H) 02/09/2021     COAGS: No results found for: PTT, INR, FIBR  POC: No results found for: BGM, HCG, HCGS  HEPATIC:   Lab Results   Component Value Date    ALBUMIN 3.9 02/25/2021    PROTTOTAL 7.4 02/25/2021    ALT 65 02/25/2021    AST 18 02/25/2021    ALKPHOS 92 02/25/2021    BILITOTAL 0.4 02/25/2021     OTHER:   Lab Results   Component Value Date    LACT 1.2 02/09/2021    JENNIFER 9.3 02/25/2021    MAG 2.4 (H) 01/12/2007    TSH 3.04 02/09/2021    CRP <2.9 02/25/2021    SED 22 (H) 02/25/2021       Anesthesia Plan    ASA Status:  2   NPO Status:  NPO Appropriate    Anesthesia Type: MAC.     - Reason for MAC: immobility needed   Induction: Propofol, Intravenous.   Maintenance: TIVA.        Consents    Anesthesia Plan(s) and associated risks, benefits, and realistic alternatives discussed. Questions answered and patient/representative(s) expressed understanding.     - Discussed: Risks, Benefits and Alternatives for BOTH SEDATION and the PROCEDURE were discussed     - Discussed with:  Patient      - Extended Intubation/Ventilatory Support Discussed: No.      - Patient is DNR/DNI Status: No    Use of blood products discussed: No .     Postoperative Care            Comments:    Other Comments: The risks and benefits of anesthesia, and the  alternatives where applicable, have been discussed with the patient, and they wish to proceed.               Festus López APRN CRNA

## 2022-05-20 NOTE — ANESTHESIA CARE TRANSFER NOTE
Patient: Tomy Maldonado    Procedure: Procedure(s):  COLONOSCOPY       Diagnosis: Screen for colon cancer [Z12.11]  Diagnosis Additional Information: No value filed.    Anesthesia Type:   MAC     Note:    Oropharynx: oropharynx clear of all foreign objects and spontaneously breathing  Level of Consciousness: awake  Oxygen Supplementation: room air    Independent Airway: airway patency satisfactory and stable  Dentition: dentition unchanged  Vital Signs Stable: post-procedure vital signs reviewed and stable  Report to RN Given: handoff report given  Patient transferred to: Phase II    Handoff Report: Identifed the Patient, Identified the Reponsible Provider, Reviewed the pertinent medical history, Discussed the surgical course, Reviewed Intra-OP anesthesia mangement and issues during anesthesia, Set expectations for post-procedure period and Allowed opportunity for questions and acknowledgement of understanding      Vitals:  Vitals Value Taken Time   BP     Temp     Pulse     Resp     SpO2 98 % 05/20/22 1225   Vitals shown include unvalidated device data.    Electronically Signed By: AUSTIN Morejon CRNA  May 20, 2022  12:26 PM

## 2022-05-20 NOTE — ANESTHESIA POSTPROCEDURE EVALUATION
Patient: Tomy Maldonado    Procedure: Procedure(s):  COLONOSCOPY       Anesthesia Type:  MAC    Note:  Disposition: Outpatient   Postop Pain Control: Uneventful            Sign Out: Well controlled pain   PONV: No   Neuro/Psych: Uneventful            Sign Out: Acceptable/Baseline neuro status   Airway/Respiratory: Uneventful            Sign Out: Acceptable/Baseline resp. status   CV/Hemodynamics: Uneventful            Sign Out: Acceptable CV status   Other NRE: NONE   DID A NON-ROUTINE EVENT OCCUR? No    Event details/Postop Comments:  Pt was happy with anesthesia care.  No complications.  I will follow up with the pt if needed.           Last vitals:  Vitals Value Taken Time   /79 05/20/22 1250   Temp     Pulse 53 05/20/22 1250   Resp     SpO2 92 % 05/20/22 1236   Vitals shown include unvalidated device data.    Electronically Signed By: AUSTIN Morejon CRNA  May 20, 2022  1:23 PM

## 2022-05-23 DIAGNOSIS — I10 HYPERTENSION GOAL BP (BLOOD PRESSURE) < 140/90: ICD-10-CM

## 2022-05-24 RX ORDER — IRBESARTAN 300 MG/1
TABLET ORAL
Qty: 90 TABLET | Refills: 0 | Status: SHIPPED | OUTPATIENT
Start: 2022-05-24 | End: 2023-01-31

## 2022-05-24 RX ORDER — METOPROLOL SUCCINATE 25 MG/1
TABLET, EXTENDED RELEASE ORAL
Qty: 90 TABLET | Refills: 0 | Status: SHIPPED | OUTPATIENT
Start: 2022-05-24 | End: 2022-08-26

## 2022-08-22 DIAGNOSIS — I10 HYPERTENSION GOAL BP (BLOOD PRESSURE) < 140/90: ICD-10-CM

## 2022-08-22 DIAGNOSIS — F43.23 ADJUSTMENT DISORDER WITH MIXED ANXIETY AND DEPRESSED MOOD: ICD-10-CM

## 2022-08-26 ENCOUNTER — MYC MEDICAL ADVICE (OUTPATIENT)
Dept: ADMISSION | Facility: CLINIC | Age: 67
End: 2022-08-26

## 2022-08-26 DIAGNOSIS — F43.23 ADJUSTMENT DISORDER WITH MIXED ANXIETY AND DEPRESSED MOOD: ICD-10-CM

## 2022-08-26 RX ORDER — ALPRAZOLAM 0.5 MG
TABLET ORAL
Qty: 20 TABLET | Refills: 0 | OUTPATIENT
Start: 2022-08-26

## 2022-08-26 RX ORDER — METOPROLOL SUCCINATE 25 MG/1
TABLET, EXTENDED RELEASE ORAL
Qty: 90 TABLET | Refills: 0 | Status: SHIPPED | OUTPATIENT
Start: 2022-08-26 | End: 2023-01-31

## 2022-08-26 NOTE — TELEPHONE ENCOUNTER
Pending Prescriptions:                       Disp   Refills    metoprolol succinate ER (TOPROL XL) 25 MG*90 tab*0            Sig: Take 1 tablet by mouth once daily    ALPRAZolam (XANAX) 0.5 MG tablet [Pharmac*20 tab*0            Sig: TAKE 1 TABLET BY MOUTH THREE TIMES DAILY AS           NEEDED FOR ANXIETY    Medication is being filled for 1 time raul refill only due to:  Patient is due for BP check with provider visit    Please call and help schedule.  Thank you!

## 2022-08-26 NOTE — TELEPHONE ENCOUNTER
CHITRA patient. It appears that patient is due for visit. Looks like we have informed him of this.       AUSTIN Cruz CNP  Questions or concerns please feel free to send me a Nolio message or call me  Phone : [phone]

## 2022-08-26 NOTE — TELEPHONE ENCOUNTER
"Pending Prescriptions:                       Disp   Refills    ALPRAZolam (XANAX) 0.5 MG tablet [Pharmacy*20 tab*0        Sig: TAKE 1 TABLET BY MOUTH THREE TIMES DAILY AS NEEDED           FOR ANXIETY    Signed Prescriptions:                        Disp   Refills    metoprolol succinate ER (TOPROL XL) 25 MG *90 tab*0        Sig: Take 1 tablet by mouth once daily  Authorizing Provider: TABITHA PALMA  Ordering User: GREG PALACIOS    Routing refill request to provider for review/approval because:  Drug not on the [facility] refill protocol     Requested Prescriptions   Pending Prescriptions Disp Refills    ALPRAZolam (XANAX) 0.5 MG tablet [Pharmacy Med Name: ALPRAZolam 0.5 MG Oral Tablet] 20 tablet 0     Sig: TAKE 1 TABLET BY MOUTH THREE TIMES DAILY AS NEEDED FOR ANXIETY        There is no refill protocol information for this order       Signed Prescriptions Disp Refills    metoprolol succinate ER (TOPROL XL) 25 MG 24 hr tablet 90 tablet 0     Sig: Take 1 tablet by mouth once daily        Beta-Blockers Protocol Passed - 8/22/2022  6:08 PM        Passed - Blood pressure under 140/90 in past 12 months       BP Readings from Last 3 Encounters:   05/20/22 120/79   03/10/22 120/80   02/25/21 130/84                 Passed - Patient is age 6 or older        Passed - Recent (12 mo) or future (30 days) visit within the authorizing provider's specialty     Patient has had an office visit with the authorizing provider or a provider within the authorizing providers department within the previous 12 mos or has a future within next 30 days. See \"Patient Info\" tab in inbasket, or \"Choose Columns\" in Meds & Orders section of the refill encounter.              Passed - Medication is active on med list                    "

## 2022-08-29 RX ORDER — ALPRAZOLAM 0.5 MG
TABLET ORAL
Qty: 20 TABLET | Refills: 0 | Status: SHIPPED | OUTPATIENT
Start: 2022-08-29 | End: 2023-01-31

## 2022-10-09 ENCOUNTER — HEALTH MAINTENANCE LETTER (OUTPATIENT)
Age: 67
End: 2022-10-09

## 2023-01-23 ENCOUNTER — APPOINTMENT (OUTPATIENT)
Dept: URGENT CARE | Facility: CLINIC | Age: 68
End: 2023-01-23
Payer: COMMERCIAL

## 2023-01-31 ENCOUNTER — OFFICE VISIT (OUTPATIENT)
Dept: FAMILY MEDICINE | Facility: OTHER | Age: 68
End: 2023-01-31
Payer: COMMERCIAL

## 2023-01-31 VITALS
TEMPERATURE: 97.5 F | DIASTOLIC BLOOD PRESSURE: 80 MMHG | HEART RATE: 74 BPM | SYSTOLIC BLOOD PRESSURE: 132 MMHG | WEIGHT: 194 LBS | RESPIRATION RATE: 24 BRPM | OXYGEN SATURATION: 98 % | HEIGHT: 66 IN | BODY MASS INDEX: 31.18 KG/M2

## 2023-01-31 DIAGNOSIS — I10 HYPERTENSION GOAL BP (BLOOD PRESSURE) < 140/90: ICD-10-CM

## 2023-01-31 DIAGNOSIS — F43.23 ADJUSTMENT DISORDER WITH MIXED ANXIETY AND DEPRESSED MOOD: Primary | ICD-10-CM

## 2023-01-31 PROCEDURE — 99214 OFFICE O/P EST MOD 30 MIN: CPT | Performed by: PHYSICIAN ASSISTANT

## 2023-01-31 RX ORDER — METOPROLOL SUCCINATE 25 MG/1
25 TABLET, EXTENDED RELEASE ORAL DAILY
Qty: 90 TABLET | Refills: 1 | Status: SHIPPED | OUTPATIENT
Start: 2023-01-31 | End: 2023-08-16

## 2023-01-31 RX ORDER — BUPROPION HYDROCHLORIDE 150 MG/1
150 TABLET ORAL EVERY MORNING
Qty: 90 TABLET | Refills: 1 | Status: SHIPPED | OUTPATIENT
Start: 2023-01-31 | End: 2023-03-13

## 2023-01-31 RX ORDER — IRBESARTAN 300 MG/1
300 TABLET ORAL DAILY
Qty: 90 TABLET | Refills: 1 | Status: SHIPPED | OUTPATIENT
Start: 2023-01-31 | End: 2023-08-16

## 2023-01-31 ASSESSMENT — ANXIETY QUESTIONNAIRES
GAD7 TOTAL SCORE: 3
5. BEING SO RESTLESS THAT IT IS HARD TO SIT STILL: SEVERAL DAYS
7. FEELING AFRAID AS IF SOMETHING AWFUL MIGHT HAPPEN: NOT AT ALL
3. WORRYING TOO MUCH ABOUT DIFFERENT THINGS: NOT AT ALL
4. TROUBLE RELAXING: NOT AT ALL
6. BECOMING EASILY ANNOYED OR IRRITABLE: MORE THAN HALF THE DAYS
GAD7 TOTAL SCORE: 3
1. FEELING NERVOUS, ANXIOUS, OR ON EDGE: NOT AT ALL
2. NOT BEING ABLE TO STOP OR CONTROL WORRYING: NOT AT ALL
IF YOU CHECKED OFF ANY PROBLEMS ON THIS QUESTIONNAIRE, HOW DIFFICULT HAVE THESE PROBLEMS MADE IT FOR YOU TO DO YOUR WORK, TAKE CARE OF THINGS AT HOME, OR GET ALONG WITH OTHER PEOPLE: NOT DIFFICULT AT ALL

## 2023-01-31 ASSESSMENT — PATIENT HEALTH QUESTIONNAIRE - PHQ9: SUM OF ALL RESPONSES TO PHQ QUESTIONS 1-9: 3

## 2023-01-31 ASSESSMENT — PAIN SCALES - GENERAL: PAINLEVEL: NO PAIN (0)

## 2023-01-31 NOTE — PROGRESS NOTES
"  Assessment & Plan     Adjustment disorder with mixed anxiety and depressed mood  Patient has had struggles with Lexapro prolonged erection making completion of sexual intercourse near impossible.  He stopped the Lexapro about 4 weeks ago and has not had a problem since.  He would still like to have something for mood control since he has had problems with this more recently since stopping the medication.  Advised medication as noted below and follow-up in 4 to 6 weeks with a full physical and med recheck.  - buPROPion (WELLBUTRIN XL) 150 MG 24 hr tablet; Take 1 tablet (150 mg) by mouth every morning    Hypertension goal BP (blood pressure) < 140/90  Good control at this time.  - metoprolol succinate ER (TOPROL XL) 25 MG 24 hr tablet; Take 1 tablet (25 mg) by mouth daily  - irbesartan (AVAPRO) 300 MG tablet; Take 1 tablet (300 mg) by mouth daily     BMI:   Estimated body mass index is 31.55 kg/m  as calculated from the following:    Height as of this encounter: 1.67 m (5' 5.75\").    Weight as of this encounter: 88 kg (194 lb).   Weight management plan: Discussed healthy diet and exercise guidelines    Work on weight loss  Regular exercise  No follow-ups on file.    DAYRON [facility] MAMIE Hyde is a 67 year old accompanied by his spouse, presenting for the following health issues:  Recheck Medication      History of Present Illness       Hypertension: He presents for follow up of hypertension.  He does check blood pressure  regularly outside of the clinic. Outside blood pressures have been over 140/90. He follows a low salt diet.         Depression and Anxiety Follow-Up    How are you doing with your depression since your last visit? No change    How are you doing with your anxiety since your last visit?  No change    Are you having other symptoms that might be associated with depression or anxiety? No    Have you had a significant life event? No     Do you have any " "concerns with your use of alcohol or other drugs? No    Social History     Tobacco Use     Smoking status: Former     Packs/day: 0.50     Types: Cigarettes     Start date: 1964     Quit date: 2016     Years since quittin.3     Smokeless tobacco: Never     Tobacco comments:     4 cigs per day   Vaping Use     Vaping Use: Never used   Substance Use Topics     Alcohol use: Not Currently     Drug use: No     PHQ 2022   PHQ-9 Total Score 22   Q9: Thoughts of better off dead/self-harm past 2 weeks Several days   F/U: Thoughts of suicide or self-harm No   F/U: Safety concerns No     HEATHER-7 SCORE 2022 3/10/2022   Total Score 21 (severe anxiety) -   Total Score 21 11     Last PHQ-9 2022   1.  Little interest or pleasure in doing things 3   2.  Feeling down, depressed, or hopeless 3   3.  Trouble falling or staying asleep, or sleeping too much 3   4.  Feeling tired or having little energy 3   5.  Poor appetite or overeating 3   6.  Feeling bad about yourself 3   7.  Trouble concentrating 3   8.  Moving slowly or restless 0   Q9: Thoughts of better off dead/self-harm past 2 weeks 1   PHQ-9 Total Score 22   In the past two weeks have you had thoughts of suicide or self harm? No   Do you have concerns about your personal safety or the safety of others? No     Review of Systems   Constitutional, HEENT, cardiovascular, pulmonary, GI, , musculoskeletal, neuro, skin, endocrine and psych systems are negative, except as otherwise noted.      Objective    /80   Pulse 74   Temp 97.5  F (36.4  C) (Temporal)   Resp 24   Ht 1.67 m (5' 5.75\")   Wt 88 kg (194 lb)   SpO2 98%   BMI 31.55 kg/m    Body mass index is 31.55 kg/m .  Physical Exam   GENERAL: healthy, alert and no distress  RESP: lungs clear to auscultation - no rales, rhonchi or wheezes  CV: regular rate and rhythm, normal S1 S2, no S3 or S4, no murmur, click or rub, no peripheral edema and peripheral pulses strong  MS: no gross " musculoskeletal defects noted, no edema  SKIN: no suspicious lesions or rashes to exposed visible skin  NEURO: Normal strength and tone, mentation intact and speech normal  PSYCH: mentation appears normal, affect normal/bright    No results found for this or any previous visit (from the past 24 hour(s)).

## 2023-03-13 ENCOUNTER — OFFICE VISIT (OUTPATIENT)
Dept: FAMILY MEDICINE | Facility: OTHER | Age: 68
End: 2023-03-13
Payer: COMMERCIAL

## 2023-03-13 VITALS
OXYGEN SATURATION: 94 % | SYSTOLIC BLOOD PRESSURE: 154 MMHG | WEIGHT: 195.5 LBS | HEART RATE: 78 BPM | TEMPERATURE: 97.1 F | HEIGHT: 66 IN | RESPIRATION RATE: 20 BRPM | DIASTOLIC BLOOD PRESSURE: 94 MMHG | BODY MASS INDEX: 31.42 KG/M2

## 2023-03-13 DIAGNOSIS — Z12.5 PROSTATE CANCER SCREENING: ICD-10-CM

## 2023-03-13 DIAGNOSIS — G47.00 PERSISTENT INSOMNIA: ICD-10-CM

## 2023-03-13 DIAGNOSIS — E78.5 HYPERLIPIDEMIA LDL GOAL <130: ICD-10-CM

## 2023-03-13 DIAGNOSIS — Z00.00 ENCOUNTER FOR MEDICARE ANNUAL WELLNESS EXAM: Primary | ICD-10-CM

## 2023-03-13 DIAGNOSIS — I10 HYPERTENSION GOAL BP (BLOOD PRESSURE) < 140/90: ICD-10-CM

## 2023-03-13 DIAGNOSIS — F43.23 ADJUSTMENT DISORDER WITH MIXED ANXIETY AND DEPRESSED MOOD: ICD-10-CM

## 2023-03-13 DIAGNOSIS — Z00.00 ROUTINE HISTORY AND PHYSICAL EXAMINATION OF ADULT: ICD-10-CM

## 2023-03-13 LAB
ALBUMIN SERPL BCG-MCNC: 4.3 G/DL (ref 3.5–5.2)
ALP SERPL-CCNC: 93 U/L (ref 40–129)
ALT SERPL W P-5'-P-CCNC: 20 U/L (ref 10–50)
ANION GAP SERPL CALCULATED.3IONS-SCNC: 8 MMOL/L (ref 7–15)
AST SERPL W P-5'-P-CCNC: 26 U/L (ref 10–50)
BILIRUB SERPL-MCNC: 0.5 MG/DL
BUN SERPL-MCNC: 12.1 MG/DL (ref 8–23)
CALCIUM SERPL-MCNC: 9.5 MG/DL (ref 8.8–10.2)
CHLORIDE SERPL-SCNC: 105 MMOL/L (ref 98–107)
CHOLEST SERPL-MCNC: 288 MG/DL
CREAT SERPL-MCNC: 1.1 MG/DL (ref 0.67–1.17)
DEPRECATED HCO3 PLAS-SCNC: 27 MMOL/L (ref 22–29)
ERYTHROCYTE [DISTWIDTH] IN BLOOD BY AUTOMATED COUNT: 13.1 % (ref 10–15)
GFR SERPL CREATININE-BSD FRML MDRD: 74 ML/MIN/1.73M2
GLUCOSE SERPL-MCNC: 97 MG/DL (ref 70–99)
HCT VFR BLD AUTO: 44.3 % (ref 40–53)
HDLC SERPL-MCNC: 42 MG/DL
HGB BLD-MCNC: 14.5 G/DL (ref 13.3–17.7)
LDLC SERPL CALC-MCNC: 214 MG/DL
MCH RBC QN AUTO: 30 PG (ref 26.5–33)
MCHC RBC AUTO-ENTMCNC: 32.7 G/DL (ref 31.5–36.5)
MCV RBC AUTO: 92 FL (ref 78–100)
NONHDLC SERPL-MCNC: 246 MG/DL
PLATELET # BLD AUTO: 210 10E3/UL (ref 150–450)
POTASSIUM SERPL-SCNC: 4.3 MMOL/L (ref 3.4–5.3)
PROT SERPL-MCNC: 7.2 G/DL (ref 6.4–8.3)
PSA SERPL DL<=0.01 NG/ML-MCNC: 1.75 NG/ML (ref 0–4.5)
RBC # BLD AUTO: 4.84 10E6/UL (ref 4.4–5.9)
SODIUM SERPL-SCNC: 140 MMOL/L (ref 136–145)
TRIGL SERPL-MCNC: 159 MG/DL
WBC # BLD AUTO: 6.7 10E3/UL (ref 4–11)

## 2023-03-13 PROCEDURE — G0439 PPPS, SUBSEQ VISIT: HCPCS | Performed by: PHYSICIAN ASSISTANT

## 2023-03-13 PROCEDURE — 99214 OFFICE O/P EST MOD 30 MIN: CPT | Mod: 25 | Performed by: PHYSICIAN ASSISTANT

## 2023-03-13 PROCEDURE — 80053 COMPREHEN METABOLIC PANEL: CPT | Performed by: PHYSICIAN ASSISTANT

## 2023-03-13 PROCEDURE — 85027 COMPLETE CBC AUTOMATED: CPT | Performed by: PHYSICIAN ASSISTANT

## 2023-03-13 PROCEDURE — G0103 PSA SCREENING: HCPCS | Performed by: PHYSICIAN ASSISTANT

## 2023-03-13 PROCEDURE — 36415 COLL VENOUS BLD VENIPUNCTURE: CPT | Performed by: PHYSICIAN ASSISTANT

## 2023-03-13 PROCEDURE — 80061 LIPID PANEL: CPT | Performed by: PHYSICIAN ASSISTANT

## 2023-03-13 RX ORDER — HYDROXYZINE HYDROCHLORIDE 25 MG/1
25-50 TABLET, FILM COATED ORAL
Qty: 60 TABLET | Refills: 0 | Status: SHIPPED | OUTPATIENT
Start: 2023-03-13

## 2023-03-13 ASSESSMENT — ENCOUNTER SYMPTOMS
CHILLS: 0
DIZZINESS: 0
ABDOMINAL PAIN: 0
FREQUENCY: 0
SORE THROAT: 0
FEVER: 0
MYALGIAS: 0
HEARTBURN: 0
DIARRHEA: 0
HEMATOCHEZIA: 0
HEMATURIA: 0
ARTHRALGIAS: 0
HEADACHES: 0
DYSURIA: 0
CONSTIPATION: 0
PARESTHESIAS: 0
SHORTNESS OF BREATH: 0
EYE PAIN: 0
PALPITATIONS: 0
JOINT SWELLING: 0
COUGH: 0
NERVOUS/ANXIOUS: 0
WEAKNESS: 0
NAUSEA: 0

## 2023-03-13 ASSESSMENT — ACTIVITIES OF DAILY LIVING (ADL): CURRENT_FUNCTION: NO ASSISTANCE NEEDED

## 2023-03-13 ASSESSMENT — PAIN SCALES - GENERAL: PAINLEVEL: NO PAIN (0)

## 2023-03-13 NOTE — PROGRESS NOTES
"SUBJECTIVE:   Barrett is a 67 year old who presents for Preventive Visit.    Patient has been advised of split billing requirements and indicates understanding: Yes     Are you in the first 12 months of your Medicare coverage?  No    Healthy Habits:     In general, how would you rate your overall health?  Excellent    Frequency of exercise:  6-7 days/week    Duration of exercise:  Greater than 60 minutes    Do you usually eat at least 4 servings of fruit and vegetables a day, include whole grains    & fiber and avoid regularly eating high fat or \"junk\" foods?  Yes    Taking medications regularly:  Yes    Medication side effects:  Not applicable    Ability to successfully perform activities of daily living:  No assistance needed    Home Safety:  No safety concerns identified    Hearing Impairment:  No hearing concerns    In the past 6 months, have you been bothered by leaking of urine?  No    In general, how would you rate your overall mental or emotional health?  Excellent      PHQ-2 Total Score: 0    Additional concerns today:  No      Have you ever done Advance Care Planning? (For example, a Health Directive, POLST, or a discussion with a medical provider or your loved ones about your wishes): No, advance care planning information given to patient to review.  Patient plans to discuss their wishes with loved ones or provider.         Fall risk  Fallen 2 or more times in the past year?: No  Any fall with injury in the past year?: No    Cognitive Screening   1) Repeat 3 items (Leader, Season, Table)    2) Clock draw: NORMAL  3) 3 item recall: Recalls 1 object   Results: ABNORMAL clock, 1-2 items recalled: PROBABLE COGNITIVE IMPAIRMENT, **INFORM PROVIDER**    Mini-CogTM Copyright VALE Salinas. Licensed by the author for use in [facility]; reprinted with permission (aguilar@.[facility]). All rights reserved.      Do you have sleep apnea, excessive snoring or daytime drowsiness?: yes    Reviewed and updated as needed " this visit by clinical staff   Tobacco  Allergies               Reviewed and updated as needed this visit by Provider                 Social History     Tobacco Use     Smoking status: Former     Packs/day: 0.50     Types: Cigarettes     Start date: 1964     Quit date: 2016     Years since quittin.4     Smokeless tobacco: Never     Tobacco comments:     4 cigs per day   Substance Use Topics     Alcohol use: Not Currently         Alcohol Use 3/13/2023   Prescreen: >3 drinks/day or >7 drinks/week? Not Applicable   AUDIT SCORE  -       Current providers sharing in care for this patient include:   Patient Care Team:  Demetris Ham PA-C as PCP - General (Physician Assistant)  Demetris Ham PA-C as Assigned PCP    The following health maintenance items are reviewed in Epic and correct as of today:  Health Maintenance   Topic Date Due     COVID-19 Vaccine (1) Never done     ZOSTER IMMUNIZATION (1 of 2) Never done     Pneumococcal Vaccine: 65+ Years (1 - PCV) Never done     AORTIC ANEURYSM SCREENING (SYSTEM ASSIGNED)  Never done     LIPID  2022     BMP  2022     INFLUENZA VACCINE (1) Never done     ANNUAL REVIEW OF HM ORDERS  03/10/2023     MEDICARE ANNUAL WELLNESS VISIT  03/10/2023     FALL RISK ASSESSMENT  2024     ADVANCE CARE PLANNING  2024     DTAP/TDAP/TD IMMUNIZATION (7 - Td or Tdap) 08/15/2029     COLORECTAL CANCER SCREENING  2032     HEPATITIS C SCREENING  Completed     PHQ-2 (once per calendar year)  Completed     IPV IMMUNIZATION  Aged Out     MENINGITIS IMMUNIZATION  Aged Out     LUNG CANCER SCREENING  Discontinued     Lab work is in process  Labs reviewed in EPIC  BP Readings from Last 3 Encounters:   23 (!) 154/94   23 132/80   22 120/79    Wt Readings from Last 3 Encounters:   23 88.7 kg (195 lb 8 oz)   23 88 kg (194 lb)   03/10/22 77.5 kg (170 lb 12.8 oz)                  Patient Active Problem List   Diagnosis     Cervicalgia      Allergic rhinitis     Hyperlipidemia LDL goal <130     Tendinopathy of left rotator cuff     Hypertension goal BP (blood pressure) < 140/90     Left eye pain     Elevated serum creatinine     Adjustment disorder with mixed anxiety and depressed mood     Persistent insomnia     Past Surgical History:   Procedure Laterality Date     COLONOSCOPY  2009    normal     COLONOSCOPY N/A 2022    Procedure: COLONOSCOPY;  Surgeon: Hi Owens MD;  Location: [facility]     HC KNEE SCOPE,MED/LAT MENISECTOMY      Left knee medial meniscectomy     HC REMOVAL TESTIS,SIMPLE  10/01/2007    Right inguinal orchiectomy.     ZZC NONSPECIFIC PROCEDURE      left ankle surgery     ZZC REPAIR DETACH RETINA,SCLERAL BUCKLE         Social History     Tobacco Use     Smoking status: Former     Packs/day: 0.50     Types: Cigarettes     Start date: 1964     Quit date: 2016     Years since quittin.4     Smokeless tobacco: Never     Tobacco comments:     4 cigs per day   Substance Use Topics     Alcohol use: Not Currently     Family History   Problem Relation Age of Onset     Hypertension Mother      Hypertension Father      Diabetes Father         adult, late onset     Lipids Father         ?     Allergies Father      Allergies Paternal Grandfather      Allergies Sister      Allergies Sister          Current Outpatient Medications   Medication Sig Dispense Refill     hydrOXYzine (ATARAX) 25 MG tablet Take 1-2 tablets (25-50 mg) by mouth nightly as needed (sleep) 60 tablet 0     irbesartan (AVAPRO) 300 MG tablet Take 1 tablet (300 mg) by mouth daily 90 tablet 1     metoprolol succinate ER (TOPROL XL) 25 MG 24 hr tablet Take 1 tablet (25 mg) by mouth daily 90 tablet 1     Allergies   Allergen Reactions     No Known Drug Allergies      Recent Labs   Lab Test 21  1234 21  1637 21  0932 19  1118 19  1133   LDL  --   --  68 97 258*   HDL  --   --  80 65 76   TRIG  --   --  186* 142 140   ALT 65  "102* 75* 46 57   CR 1.14 1.58* 1.51* 1.12 1.08   GFRESTIMATED 67 45* 48* 69 72   GFRESTBLACK 77 52* 55* 80 84   POTASSIUM 4.5 4.4 4.6 4.6 5.0   TSH  --  3.04  --   --   --       Pneumonia Vaccine:For adults with an immunocompromising condition, cerebrospinal fluid leak, or cochlear implant, administer 1 dose of PCV13 first then give 1 dose of PPSV23 at least 1 year later. Anyone who received any doses of PPSV23 before age 65 should receive 1 final dose of the vaccine at age 65 or older. Administer this last dose at least 5 years after the prior PPSV23 dose.        Review of Systems   Constitutional: Negative for chills and fever.   HENT: Negative for congestion, ear pain, hearing loss and sore throat.    Eyes: Negative for pain and visual disturbance.   Respiratory: Negative for cough and shortness of breath.    Cardiovascular: Negative for chest pain, palpitations and peripheral edema.   Gastrointestinal: Negative for abdominal pain, constipation, diarrhea, heartburn, hematochezia and nausea.   Genitourinary: Negative for dysuria, frequency, genital sores, hematuria, impotence, penile discharge and urgency.   Musculoskeletal: Negative for arthralgias, joint swelling and myalgias.   Skin: Negative for rash.   Neurological: Negative for dizziness, weakness, headaches and paresthesias.   Psychiatric/Behavioral: Negative for mood changes. The patient is not nervous/anxious.      Constitutional, HEENT, cardiovascular, pulmonary, GI, , musculoskeletal, neuro, skin, endocrine and psych systems are negative, except as otherwise noted.    OBJECTIVE:   BP (!) 150/94   Pulse 78   Temp 97.1  F (36.2  C) (Temporal)   Resp 20   Ht 1.676 m (5' 6\")   Wt 88.7 kg (195 lb 8 oz)   SpO2 94%   BMI 31.55 kg/m   Estimated body mass index is 31.55 kg/m  as calculated from the following:    Height as of this encounter: 1.676 m (5' 6\").    Weight as of this encounter: 88.7 kg (195 lb 8 oz).  Physical Exam  GENERAL: healthy, alert " and no distress  EYES: Eyes grossly normal to inspection, PERRL and conjunctivae and sclerae normal  HENT: ear canals and TM's normal, nose and mouth without ulcers or lesions  NECK: no adenopathy, no asymmetry, masses, or scars and thyroid normal to palpation  RESP: lungs clear to auscultation - no rales, rhonchi or wheezes  CV: regular rate and rhythm, normal S1 S2, no S3 or S4, no murmur, click or rub, no peripheral edema and peripheral pulses strong  ABDOMEN: soft, nontender, no hepatosplenomegaly, no masses and bowel sounds normal  MS: no gross musculoskeletal defects noted, no edema  SKIN: no suspicious lesions or rashes  NEURO: Normal strength and tone, mentation intact and speech normal  PSYCH: mentation appears normal, affect normal/bright    Diagnostic Test Results:  Labs reviewed in Epic  No results found for any visits on 03/13/23.    ASSESSMENT / PLAN:   Tomy was seen today for wellness visit.    Diagnoses and all orders for this visit:    Encounter for Medicare annual wellness exam    Routine history and physical examination of adult  -     CBC with platelets; Future  -     Comprehensive metabolic panel (BMP + Alb, Alk Phos, ALT, AST, Total. Bili, TP); Future  -     Lipid panel reflex to direct LDL Fasting; Future  -     CBC with platelets  -     Comprehensive metabolic panel (BMP + Alb, Alk Phos, ALT, AST, Total. Bili, TP)  -     Lipid panel reflex to direct LDL Fasting    Hyperlipidemia LDL goal <130    Hypertension goal BP (blood pressure) < 140/90    Adjustment disorder with mixed anxiety and depressed mood    Persistent insomnia  -     hydrOXYzine (ATARAX) 25 MG tablet; Take 1-2 tablets (25-50 mg) by mouth nightly as needed (sleep)    Prostate cancer screening  -     PSA, screen; Future  -     PSA, screen    67-year-old male here for routine physical/annual wellness exam.  Repeat in 1 year.    Labs are pending related to cholesterol.  Do recommend that he try to get his LDL cholesterol to less  than 100 given the fact that his blood pressure is not under good control today.  He declines any further medication related to this.  We will have him follow-up in 4 to 6 weeks to recheck his blood pressure.    He declines any medication for his anxiety/depression.  He tried a single tablet of bupropion and then read the side effect profile and never take any medications related to this again.    He reports persistent insomnia despite taking multiple over-the-counter sleep aids with no success.  Advised trial of hydroxyzine as directed and follow-up in clinic should this not work for further consideration.    Prostate cancer screening due.    Patient has been advised of split billing requirements and indicates understanding: Yes      COUNSELING:       Regular exercise       Healthy diet/nutrition       Vision screening       Hearing screening       Dental care       Bladder control       Fall risk prevention        He reports that he quit smoking about 6 years ago. He started smoking about 58 years ago. He smoked an average of .5 packs per day. He has never used smokeless tobacco.      Appropriate preventive services were discussed with this patient, including applicable screening as appropriate for cardiovascular disease, diabetes, osteopenia/osteoporosis, and glaucoma.  As appropriate for age/gender, discussed screening for colorectal cancer, prostate cancer, breast cancer, and cervical cancer. Checklist reviewing preventive services available has been given to the patient.    Reviewed patients plan of care and provided an AVS. The Basic Care Plan (routine screening as documented in Health Maintenance) for Tomy meets the Care Plan requirement. This Care Plan has been established and reviewed with the Patient.    Demetris Duke PA-C  [facility]    Identified Health Risks:

## 2023-03-13 NOTE — PATIENT INSTRUCTIONS
Patient Education   Personalized Prevention Plan  You are due for the preventive services outlined below.  Your care team is available to assist you in scheduling these services.  If you have already completed any of these items, please share that information with your care team to update in your medical record.  Health Maintenance Due   Topic Date Due     COVID-19 Vaccine (1) Never done     Zoster (Shingles) Vaccine (1 of 2) Never done     Pneumococcal Vaccine (1 - PCV) Never done     AORTIC ANEURYSM SCREENING (SYSTEM ASSIGNED)  Never done     Cholesterol Lab  01/14/2022     Basic Metabolic Panel  02/25/2022     Flu Vaccine (1) Never done     ANNUAL REVIEW OF HM ORDERS  03/10/2023     Annual Wellness Visit  03/10/2023

## 2023-08-15 DIAGNOSIS — I10 HYPERTENSION GOAL BP (BLOOD PRESSURE) < 140/90: ICD-10-CM

## 2023-08-16 RX ORDER — METOPROLOL SUCCINATE 25 MG/1
25 TABLET, EXTENDED RELEASE ORAL DAILY
Qty: 90 TABLET | Refills: 0 | Status: SHIPPED | OUTPATIENT
Start: 2023-08-16

## 2023-08-16 RX ORDER — IRBESARTAN 300 MG/1
300 TABLET ORAL DAILY
Qty: 90 TABLET | Refills: 0 | Status: SHIPPED | OUTPATIENT
Start: 2023-08-16

## 2024-02-12 ENCOUNTER — PATIENT OUTREACH (OUTPATIENT)
Dept: CARE COORDINATION | Facility: CLINIC | Age: 69
End: 2024-02-12
Payer: COMMERCIAL

## 2024-02-26 ENCOUNTER — PATIENT OUTREACH (OUTPATIENT)
Dept: CARE COORDINATION | Facility: CLINIC | Age: 69
End: 2024-02-26
Payer: COMMERCIAL

## 2024-05-16 ENCOUNTER — TELEPHONE (OUTPATIENT)
Dept: FAMILY MEDICINE | Facility: OTHER | Age: 69
End: 2024-05-16

## 2024-05-16 NOTE — LETTER
5/16/2024      Tomy Maldonado  [address]  [place] [address]      Hi Tomy,      Our records indicate that you are due for a Annual Wellness Check with your Primary Care Provider.      Our records also indicate that you are due for some immunizations, labs and preventative screenings.      Please schedule an appointment via [employer] or by calling the clinic at [phone], and asking to speak to a member of your care team.          To Schedule an Appointment via [employer] -      1) Click the Visits tab (located on the top left) and select the option to Schedule an Appointment  2) Choose the department you would like to schedule in and respond to any additional questions that populate.  (Note: There are also options for you to indicate preferred clinic locations and providers)  3) Verify your Personal Information by clicking This Information Is Correct or Edit if the information is not correct  4) Review the insurance information on file and if current, select This Information Is Correct              If the information is not correct, you can select options to remove or update your coverage.  5) Where prompted, please specify the reason for your appointment and select Schedule     After the appointment has been scheduled, you will see a confirmation with all the details for your upcoming visit.        Take Care,  Your myThings Patient Care Team     [facility]  Phone: [phone]  Fax: [phone]

## 2024-05-16 NOTE — TELEPHONE ENCOUNTER
Patient Quality Outreach    Patient is due for the following:   Physical Annual Wellness Visit    Next Steps:   Schedule a Annual Wellness Visit    Type of outreach:    Sent ShareThe message.    2nd: Letter sent.     Questions for provider review:    None           Brii Mcdaniels MA  Chart routed to Care Team.

## 2024-05-25 ENCOUNTER — HEALTH MAINTENANCE LETTER (OUTPATIENT)
Age: 69
End: 2024-05-25

## 2024-09-09 ENCOUNTER — PATIENT OUTREACH (OUTPATIENT)
Dept: CARE COORDINATION | Facility: CLINIC | Age: 69
End: 2024-09-09
Payer: COMMERCIAL

## 2025-06-14 ENCOUNTER — HEALTH MAINTENANCE LETTER (OUTPATIENT)
Age: 70
End: 2025-06-14

## (undated) DEVICE — TUBING SUCTION 12"X1/4" N612

## (undated) DEVICE — SOL WATER IRRIG 1000ML BOTTLE 07139-09

## (undated) DEVICE — GLOVE EXAM NITRILE LG

## (undated) DEVICE — KIT ENDO TURNOVER/PROCEDURE CARRY-ON 101822

## (undated) DEVICE — LUBRICATING JELLY 4.25OZ